# Patient Record
Sex: FEMALE | Race: WHITE | Employment: UNEMPLOYED | URBAN - METROPOLITAN AREA
[De-identification: names, ages, dates, MRNs, and addresses within clinical notes are randomized per-mention and may not be internally consistent; named-entity substitution may affect disease eponyms.]

---

## 2017-04-19 ENCOUNTER — HOSPITAL ENCOUNTER (OUTPATIENT)
Dept: ULTRASOUND IMAGING | Age: 22
Discharge: HOME | End: 2017-04-19
Payer: COMMERCIAL

## 2017-04-19 DIAGNOSIS — N91.2 AMENORRHEA: ICD-10-CM

## 2017-04-19 PROCEDURE — 76801 OB US < 14 WKS SINGLE FETUS: CPT

## 2017-04-25 ENCOUNTER — INITIAL PRENATAL (OUTPATIENT)
Dept: OBGYN | Age: 22
End: 2017-04-25

## 2017-04-25 VITALS — SYSTOLIC BLOOD PRESSURE: 108 MMHG | DIASTOLIC BLOOD PRESSURE: 72 MMHG | WEIGHT: 156 LBS

## 2017-04-25 DIAGNOSIS — Z34.01 ENCOUNTER FOR SUPERVISION OF NORMAL FIRST PREGNANCY IN FIRST TRIMESTER: Primary | ICD-10-CM

## 2017-04-25 DIAGNOSIS — Z34.01 ENCOUNTER FOR SUPERVISION OF NORMAL FIRST PREGNANCY IN FIRST TRIMESTER: ICD-10-CM

## 2017-04-25 LAB
ABO/RH: NORMAL
ANTIBODY SCREEN: NORMAL
APTT: 27.1 SEC (ref 21.6–35.4)
BASOPHILS ABSOLUTE: 0.1 K/UL (ref 0–0.2)
BASOPHILS RELATIVE PERCENT: 0.6 %
EOSINOPHILS ABSOLUTE: 0.1 K/UL (ref 0–0.7)
EOSINOPHILS RELATIVE PERCENT: 1.5 %
GLUCOSE BLD-MCNC: 95 MG/DL (ref 74–109)
HCT VFR BLD CALC: 43.9 % (ref 37–47)
HEMOGLOBIN: 15.2 G/DL (ref 12–16)
HEPATITIS B SURFACE ANTIGEN INTERPRETATION: NORMAL
LYMPHOCYTES ABSOLUTE: 1.4 K/UL (ref 1–4.8)
LYMPHOCYTES RELATIVE PERCENT: 13.5 %
MCH RBC QN AUTO: 29 PG (ref 27–31.3)
MCHC RBC AUTO-ENTMCNC: 34.6 % (ref 33–37)
MCV RBC AUTO: 83.8 FL (ref 82–100)
MONOCYTES ABSOLUTE: 0.6 K/UL (ref 0.2–0.8)
MONOCYTES RELATIVE PERCENT: 6 %
NEUTROPHILS ABSOLUTE: 8 K/UL (ref 1.4–6.5)
NEUTROPHILS RELATIVE PERCENT: 78.4 %
PDW BLD-RTO: 13.1 % (ref 11.5–14.5)
PLATELET # BLD: 164 K/UL (ref 130–400)
RBC # BLD: 5.24 M/UL (ref 4.2–5.4)
RUBELLA ANTIBODY IGG: 78.9 IU/ML
WBC # BLD: 10.2 K/UL (ref 4.5–11)

## 2017-04-25 PROCEDURE — 99203 OFFICE O/P NEW LOW 30 MIN: CPT | Performed by: OBSTETRICS & GYNECOLOGY

## 2017-04-25 RX ORDER — ASCORBIC ACID, CHOLECALCIFEROL, .ALPHA.-TOCOPHEROL ACETATE, DL-, PYRIDOXINE HYDROCHLORIDE, FOLIC ACID, CYANOCOBALAMIN, BIOTIN, CALCIUM CARBONATE, FERROUS ASPARTO GLYCINATE, IRON, POTASSIUM IODIDE, MAGNESIUM OXIDE, DOCONEXENT AND LOWBUSH BLUEBERRY 60; 1000; 10; 26; 400; 13; 280; 80; 9; 9; 150; 25; 350; 25; 600 MG/1; [IU]/1; [IU]/1; MG/1; UG/1; UG/1; UG/1; MG/1; MG/1; MG/1; UG/1; MG/1; MG/1; MG/1; UG/1
CAPSULE, GELATIN COATED ORAL
Qty: 3 CAPSULE | Refills: 0
Start: 2017-04-25 | End: 2018-08-16

## 2017-04-25 RX ORDER — .PYRIDOXINE HYDROCHLORIDE, CYANOCOBALAMIN, CALCIUM CARBONATE AND FOLIC ACID 75; 12; 200; 1 MG/1; UG/1; MG/1; MG/1
TABLET, COATED ORAL
Qty: 3 TABLET | Refills: 0
Start: 2017-04-25 | End: 2018-08-16

## 2017-04-25 RX ORDER — PRENATAL 6/IRON/FA/B12/CALC/D3 35 MG-5 MG
TABLET, SEQUENTIAL ORAL
Qty: 7 EACH | Refills: 0
Start: 2017-04-25 | End: 2017-04-28 | Stop reason: SDUPTHER

## 2017-04-25 RX ORDER — ASCORBIC ACID, BIOTIN, CHOLECALCIFEROL, CYANOCOBALAMIN, FOLIC ACID, FERROUS ASPARTO GLYCINATE, POTASSIUM IODIDE, PYRIDOXINE HYDROCHLORIDE, .ALPHA.-TOCOPHEROL ACETATE, DL-, DOCUSATE SODIUM AND BLUEBERRY 30; 75; 500; 13; 400; 10; 150; 5; 10; 200; 5; 600 MG/1; UG/1; [IU]/1; UG/1; UG/1; MG/1; UG/1; MG/1; [IU]/1; MG/1; MG/1; UG/1
1 TABLET, FILM COATED ORAL DAILY
Qty: 3 CAPSULE | Refills: 0
Start: 2017-04-25 | End: 2018-08-16

## 2017-04-26 LAB
RPR: NORMAL
VZV IGG SER QL IA: 108 IV

## 2017-04-27 LAB
CHLAMYDIA TRACHOMATIS AMPLIFIED DET: NEGATIVE
HIV-1 AND HIV-2 ANTIBODIES: NEGATIVE
N GONORRHOEAE AMPLIFIED DET: NEGATIVE
SPECIMEN SOURCE: NORMAL

## 2017-04-28 RX ORDER — PRENATAL 6/IRON/FA/B12/CALC/D3 35 MG-5 MG
1 TABLET, SEQUENTIAL ORAL DAILY
Qty: 60 EACH | Refills: 6 | Status: SHIPPED | OUTPATIENT
Start: 2017-04-28 | End: 2018-05-14 | Stop reason: SDUPTHER

## 2017-04-30 RX ORDER — DOXYLAMINE SUCCINATE AND PYRIDOXINE HYDROCHLORIDE, DELAYED RELEASE TABLETS 10 MG/10 MG 10; 10 MG/1; MG/1
TABLET, DELAYED RELEASE ORAL
Qty: 24 TABLET | Refills: 0
Start: 2017-04-30 | End: 2018-08-16

## 2017-05-03 LAB
EER NON INVASIVE PRENATAL ANEUPLOIDY: NORMAL
FETAL FRACTION: 7.9
FETAL GENDER: NORMAL
GESTATIONAL AGE (DAYS): 4
GESTATIONAL AGE(WEEKS): 11
Lab: NORMAL
MATERNAL WEIGHT: 197
MONOSOMY X: NORMAL
REPORT FETUS GENDER: YES
TRIPLOIDY (VANISHING TWIN): NORMAL
TRISOMY 13 RISK: NORMAL
TRISOMY 18 RISK ASSESSMENT: NORMAL
TRISOMY 21 RISK: NORMAL

## 2017-05-11 LAB
Lab: NORMAL
REPORT: NORMAL
THIS TEST SENT TO: NORMAL

## 2017-05-24 ENCOUNTER — TELEPHONE (OUTPATIENT)
Dept: OBGYN | Age: 22
End: 2017-05-24

## 2018-08-08 ENCOUNTER — OFFICE VISIT (OUTPATIENT)
Dept: FAMILY MEDICINE CLINIC | Age: 23
End: 2018-08-08
Payer: COMMERCIAL

## 2018-08-08 VITALS
DIASTOLIC BLOOD PRESSURE: 80 MMHG | HEART RATE: 72 BPM | TEMPERATURE: 98.5 F | SYSTOLIC BLOOD PRESSURE: 118 MMHG | HEIGHT: 65 IN | WEIGHT: 205 LBS | OXYGEN SATURATION: 98 % | BODY MASS INDEX: 34.16 KG/M2 | RESPIRATION RATE: 16 BRPM

## 2018-08-08 DIAGNOSIS — R39.9 UTI SYMPTOMS: ICD-10-CM

## 2018-08-08 DIAGNOSIS — R31.9 HEMATURIA, UNSPECIFIED TYPE: ICD-10-CM

## 2018-08-08 DIAGNOSIS — R35.0 URINARY FREQUENCY: ICD-10-CM

## 2018-08-08 DIAGNOSIS — R39.9 UTI SYMPTOMS: Primary | ICD-10-CM

## 2018-08-08 LAB
APPEARANCE FLUID: NORMAL
BILIRUBIN, POC: NORMAL
BLOOD URINE, POC: 200
CLARITY, POC: NORMAL
COLOR, POC: YELLOW
GLUCOSE URINE, POC: NORMAL
KETONES, POC: 0.5
LEUKOCYTE EST, POC: 70
NITRITE, POC: NORMAL
PH, POC: 7
PROTEIN, POC: 3
SPECIFIC GRAVITY, POC: 1.02
UROBILINOGEN, POC: 17

## 2018-08-08 PROCEDURE — 1036F TOBACCO NON-USER: CPT | Performed by: NURSE PRACTITIONER

## 2018-08-08 PROCEDURE — G8417 CALC BMI ABV UP PARAM F/U: HCPCS | Performed by: NURSE PRACTITIONER

## 2018-08-08 PROCEDURE — G8427 DOCREV CUR MEDS BY ELIG CLIN: HCPCS | Performed by: NURSE PRACTITIONER

## 2018-08-08 PROCEDURE — 99203 OFFICE O/P NEW LOW 30 MIN: CPT | Performed by: NURSE PRACTITIONER

## 2018-08-08 PROCEDURE — 81002 URINALYSIS NONAUTO W/O SCOPE: CPT | Performed by: NURSE PRACTITIONER

## 2018-08-08 RX ORDER — IBUPROFEN 600 MG/1
TABLET ORAL
COMMUNITY
Start: 2018-05-13

## 2018-08-08 RX ORDER — BLOOD-GLUCOSE METER
KIT MISCELLANEOUS
COMMUNITY
Start: 2018-05-07

## 2018-08-08 RX ORDER — NITROFURANTOIN 25; 75 MG/1; MG/1
100 CAPSULE ORAL 2 TIMES DAILY
Qty: 14 CAPSULE | Refills: 0 | Status: SHIPPED | OUTPATIENT
Start: 2018-08-08 | End: 2018-08-15

## 2018-08-08 RX ORDER — PHENAZOPYRIDINE HYDROCHLORIDE 200 MG/1
200 TABLET, FILM COATED ORAL 3 TIMES DAILY PRN
Qty: 15 TABLET | Refills: 0 | Status: SHIPPED | OUTPATIENT
Start: 2018-08-08 | End: 2018-08-13

## 2018-08-08 ASSESSMENT — ENCOUNTER SYMPTOMS
TROUBLE SWALLOWING: 0
EYE DISCHARGE: 0
EYE REDNESS: 0
NAUSEA: 1
EYE PAIN: 0
DIARRHEA: 0
SHORTNESS OF BREATH: 0
ABDOMINAL PAIN: 0
EYE ITCHING: 0
SINUS PRESSURE: 0
VOMITING: 0
PHOTOPHOBIA: 0
SORE THROAT: 0
COUGH: 0
RHINORRHEA: 0

## 2018-08-11 LAB
ORGANISM: ABNORMAL
URINE CULTURE, ROUTINE: ABNORMAL
URINE CULTURE, ROUTINE: ABNORMAL

## 2018-08-16 ENCOUNTER — OFFICE VISIT (OUTPATIENT)
Dept: FAMILY MEDICINE CLINIC | Age: 23
End: 2018-08-16
Payer: COMMERCIAL

## 2018-08-16 VITALS
BODY MASS INDEX: 33.66 KG/M2 | RESPIRATION RATE: 16 BRPM | DIASTOLIC BLOOD PRESSURE: 70 MMHG | HEART RATE: 78 BPM | SYSTOLIC BLOOD PRESSURE: 110 MMHG | OXYGEN SATURATION: 97 % | WEIGHT: 202 LBS | HEIGHT: 65 IN | TEMPERATURE: 98.9 F

## 2018-08-16 DIAGNOSIS — J45.909 UNCOMPLICATED ASTHMA, UNSPECIFIED ASTHMA SEVERITY, UNSPECIFIED WHETHER PERSISTENT: ICD-10-CM

## 2018-08-16 DIAGNOSIS — Z12.4 SCREENING FOR CERVICAL CANCER: ICD-10-CM

## 2018-08-16 DIAGNOSIS — Z13.0 SCREENING FOR BLOOD DISEASE: ICD-10-CM

## 2018-08-16 DIAGNOSIS — F32.A DEPRESSION, UNSPECIFIED DEPRESSION TYPE: ICD-10-CM

## 2018-08-16 DIAGNOSIS — A49.2 HAEMOPHILUS INFLUENZAE INFECTION: Primary | ICD-10-CM

## 2018-08-16 DIAGNOSIS — J02.9 ACUTE VIRAL PHARYNGITIS: Primary | ICD-10-CM

## 2018-08-16 DIAGNOSIS — J02.9 ACUTE VIRAL PHARYNGITIS: ICD-10-CM

## 2018-08-16 LAB
ALBUMIN SERPL-MCNC: 4.4 G/DL (ref 3.9–4.9)
ALP BLD-CCNC: 70 U/L (ref 40–130)
ALT SERPL-CCNC: 27 U/L (ref 0–33)
ANION GAP SERPL CALCULATED.3IONS-SCNC: 14 MEQ/L (ref 7–13)
AST SERPL-CCNC: 19 U/L (ref 0–35)
BASOPHILS ABSOLUTE: 0.1 K/UL (ref 0–0.2)
BASOPHILS RELATIVE PERCENT: 0.9 %
BILIRUB SERPL-MCNC: 0.5 MG/DL (ref 0–1.2)
BUN BLDV-MCNC: 10 MG/DL (ref 6–20)
CALCIUM SERPL-MCNC: 9.3 MG/DL (ref 8.6–10.2)
CHLORIDE BLD-SCNC: 101 MEQ/L (ref 98–107)
CO2: 26 MEQ/L (ref 22–29)
CONTROL: NORMAL
CREAT SERPL-MCNC: 0.63 MG/DL (ref 0.5–0.9)
EOSINOPHILS ABSOLUTE: 0.5 K/UL (ref 0–0.7)
EOSINOPHILS RELATIVE PERCENT: 5.3 %
GFR AFRICAN AMERICAN: >60
GFR NON-AFRICAN AMERICAN: >60
GLOBULIN: 3.1 G/DL (ref 2.3–3.5)
GLUCOSE BLD-MCNC: 87 MG/DL (ref 74–109)
HCT VFR BLD CALC: 48.3 % (ref 37–47)
HEMOGLOBIN: 16.4 G/DL (ref 12–16)
LYMPHOCYTES ABSOLUTE: 2.3 K/UL (ref 1–4.8)
LYMPHOCYTES RELATIVE PERCENT: 23.1 %
MCH RBC QN AUTO: 28.4 PG (ref 27–31.3)
MCHC RBC AUTO-ENTMCNC: 33.9 % (ref 33–37)
MCV RBC AUTO: 83.8 FL (ref 82–100)
MONOCYTES ABSOLUTE: 0.8 K/UL (ref 0.2–0.8)
MONOCYTES RELATIVE PERCENT: 8.2 %
NEUTROPHILS ABSOLUTE: 6.2 K/UL (ref 1.4–6.5)
NEUTROPHILS RELATIVE PERCENT: 62.5 %
PDW BLD-RTO: 13.4 % (ref 11.5–14.5)
PLATELET # BLD: 198 K/UL (ref 130–400)
POTASSIUM SERPL-SCNC: 4.4 MEQ/L (ref 3.5–5.1)
PREGNANCY TEST URINE, POC: NORMAL
RBC # BLD: 5.77 M/UL (ref 4.2–5.4)
S PYO AG THROAT QL: NORMAL
SODIUM BLD-SCNC: 141 MEQ/L (ref 132–144)
TOTAL PROTEIN: 7.5 G/DL (ref 6.4–8.1)
TSH REFLEX: 2.46 UIU/ML (ref 0.27–4.2)
WBC # BLD: 9.9 K/UL (ref 4.8–10.8)

## 2018-08-16 PROCEDURE — G8427 DOCREV CUR MEDS BY ELIG CLIN: HCPCS | Performed by: INTERNAL MEDICINE

## 2018-08-16 PROCEDURE — 99204 OFFICE O/P NEW MOD 45 MIN: CPT | Performed by: INTERNAL MEDICINE

## 2018-08-16 PROCEDURE — 87880 STREP A ASSAY W/OPTIC: CPT | Performed by: INTERNAL MEDICINE

## 2018-08-16 PROCEDURE — 81025 URINE PREGNANCY TEST: CPT | Performed by: INTERNAL MEDICINE

## 2018-08-16 PROCEDURE — G8417 CALC BMI ABV UP PARAM F/U: HCPCS | Performed by: INTERNAL MEDICINE

## 2018-08-16 PROCEDURE — 1036F TOBACCO NON-USER: CPT | Performed by: INTERNAL MEDICINE

## 2018-08-16 RX ORDER — METHYLPREDNISOLONE 4 MG/1
TABLET ORAL
Qty: 1 KIT | Refills: 0 | Status: SHIPPED | OUTPATIENT
Start: 2018-08-16 | End: 2018-08-22

## 2018-08-16 RX ORDER — ALBUTEROL SULFATE 90 UG/1
1 AEROSOL, METERED RESPIRATORY (INHALATION) EVERY 6 HOURS PRN
Qty: 1 INHALER | Refills: 2 | Status: SHIPPED | OUTPATIENT
Start: 2018-08-16 | End: 2018-11-07 | Stop reason: SDUPTHER

## 2018-08-16 ASSESSMENT — ENCOUNTER SYMPTOMS
BACK PAIN: 0
SHORTNESS OF BREATH: 0
SORE THROAT: 1
EYE PAIN: 0
ABDOMINAL PAIN: 0
COUGH: 1

## 2018-08-16 ASSESSMENT — PATIENT HEALTH QUESTIONNAIRE - PHQ9
2. FEELING DOWN, DEPRESSED OR HOPELESS: 0
SUM OF ALL RESPONSES TO PHQ QUESTIONS 1-9: 0
SUM OF ALL RESPONSES TO PHQ QUESTIONS 1-9: 0
SUM OF ALL RESPONSES TO PHQ9 QUESTIONS 1 & 2: 0
1. LITTLE INTEREST OR PLEASURE IN DOING THINGS: 0

## 2018-08-16 NOTE — PROGRESS NOTES
Subjective:      Patient ID: Betty Barrett is a 26 y.o. female who presents today with:  Chief Complaint   Patient presents with   [facility] Establish Care    Urinary Tract Infection     follow up - was seen at walk in clinic on 8/8/2018 for uti    Cough     started 8/14/2018       HPI     Pharyngitis-Acute, started about 2 days ago. No trauma to throat. Able to swallow. Feels painful. No drooling or voice changes. No hiv or std risk factors. Asthma-Last time used rescue inhaler was 1.5 years ago. Not on scheduled inhaler     Depression-Chronic, improved with zoloft 50 mg once daily. No SI.HI. Tolerating it well. Past Medical History:   Diagnosis Date    Anxiety     Asthma     Depression     Seasonal allergies      Past Surgical History:   Procedure Laterality Date    NASAL POLYP SURGERY      x3    TONSILLECTOMY AND ADENOIDECTOMY       Social History     Social History    Marital status: Single     Spouse name: N/A    Number of children: N/A    Years of education: N/A     Occupational History    Not on file. Social History Main Topics    Smoking status: Former Smoker     Types: Cigarettes    Smokeless tobacco: Never Used    Alcohol use Yes      Comment: social     Drug use: No    Sexual activity: Yes     Partners: Male     Other Topics Concern    Not on file     Social History Narrative    No narrative on file     Allergies   Allergen Reactions    Other Hives     mushrooms     Current Outpatient Prescriptions on File Prior to Visit   Medication Sig Dispense Refill    ibuprofen (ADVIL;MOTRIN) 600 MG tablet       Prenatal Multivit-Min-Fe-FA (PRENATAL VITAMINS PO) Take by mouth      FREESTYLE LITE strip        No current facility-administered medications on file prior to visit. I have personally reviewed the ROS, PMH, PFH, and social history     Review of Systems   Constitutional: Negative for chills and fever. HENT: Positive for congestion and sore throat.     Eyes: Negative for

## 2018-08-16 NOTE — PATIENT INSTRUCTIONS
chances of quitting for good. · Use a vaporizer or humidifier to add moisture to your bedroom. Follow the directions for cleaning the machine. When should you call for help? Call your doctor now or seek immediate medical care if:    · You have new or worse trouble swallowing.     · Your sore throat gets much worse on one side.    Watch closely for changes in your health, and be sure to contact your doctor if you do not get better as expected. Where can you learn more? Go to [email or web address].[email or web address] org and sign in to your [facility] account. Enter E325 in the Joberator box to learn more about \"Sore Throat: Care Instructions. \"     If you do not have an account, please click on the \"Sign Up Now\" link. Current as of: May 12, 2017  Content Version: 11.7  © 3829-3824 [employer]. Care instructions adapted under license by [facility] ([facility]). If you have questions about a medical condition or this instruction, always ask your healthcare professional. [place] [id number] any warranty or liability for your use of this information.

## 2018-08-18 RX ORDER — AMPICILLIN 500 MG/1
500 CAPSULE ORAL 4 TIMES DAILY
Qty: 28 CAPSULE | Refills: 0 | Status: SHIPPED | OUTPATIENT
Start: 2018-08-18 | End: 2018-08-25

## 2018-08-19 LAB
ORGANISM: ABNORMAL
THROAT CULTURE: ABNORMAL
THROAT CULTURE: ABNORMAL

## 2018-09-26 DIAGNOSIS — J02.9 ACUTE VIRAL PHARYNGITIS: ICD-10-CM

## 2018-09-27 RX ORDER — METHYLPREDNISOLONE 4 MG/1
TABLET ORAL
Qty: 21 TABLET | Refills: 0 | OUTPATIENT
Start: 2018-09-27

## 2018-11-07 DIAGNOSIS — J45.909 UNCOMPLICATED ASTHMA, UNSPECIFIED ASTHMA SEVERITY, UNSPECIFIED WHETHER PERSISTENT: ICD-10-CM

## 2024-01-27 ENCOUNTER — HOSPITAL ENCOUNTER (EMERGENCY)
Facility: HOSPITAL | Age: 29
Discharge: HOME | End: 2024-01-27

## 2024-01-27 ENCOUNTER — APPOINTMENT (OUTPATIENT)
Dept: RADIOLOGY | Facility: HOSPITAL | Age: 29
End: 2024-01-27

## 2024-01-27 VITALS
HEART RATE: 55 BPM | OXYGEN SATURATION: 99 % | DIASTOLIC BLOOD PRESSURE: 77 MMHG | SYSTOLIC BLOOD PRESSURE: 125 MMHG | TEMPERATURE: 97.5 F | BODY MASS INDEX: 30.12 KG/M2 | HEIGHT: 65 IN | RESPIRATION RATE: 16 BRPM | WEIGHT: 180.78 LBS

## 2024-01-27 DIAGNOSIS — T83.9XXA COMPLICATION OF INTRAUTERINE DEVICE (IUD), UNSPECIFIED COMPLICATION, INITIAL ENCOUNTER (CMS-HCC): Primary | ICD-10-CM

## 2024-01-27 LAB
APPEARANCE UR: CLEAR
BILIRUB UR STRIP.AUTO-MCNC: NEGATIVE MG/DL
COLOR UR: YELLOW
GLUCOSE UR STRIP.AUTO-MCNC: NEGATIVE MG/DL
HCG UR QL IA.RAPID: NEGATIVE
HOLD SPECIMEN: NORMAL
KETONES UR STRIP.AUTO-MCNC: NEGATIVE MG/DL
LEUKOCYTE ESTERASE UR QL STRIP.AUTO: NEGATIVE
NITRITE UR QL STRIP.AUTO: NEGATIVE
PH UR STRIP.AUTO: 6 [PH]
PROT UR STRIP.AUTO-MCNC: NEGATIVE MG/DL
RBC # UR STRIP.AUTO: ABNORMAL /UL
RBC #/AREA URNS AUTO: ABNORMAL /HPF
SP GR UR STRIP.AUTO: 1.02
SQUAMOUS #/AREA URNS AUTO: ABNORMAL /HPF
UROBILINOGEN UR STRIP.AUTO-MCNC: 2 MG/DL
WBC #/AREA URNS AUTO: ABNORMAL /HPF

## 2024-01-27 PROCEDURE — 76856 US EXAM PELVIC COMPLETE: CPT | Performed by: RADIOLOGY

## 2024-01-27 PROCEDURE — 76856 US EXAM PELVIC COMPLETE: CPT

## 2024-01-27 PROCEDURE — 81025 URINE PREGNANCY TEST: CPT | Performed by: NURSE PRACTITIONER

## 2024-01-27 PROCEDURE — 76830 TRANSVAGINAL US NON-OB: CPT | Performed by: RADIOLOGY

## 2024-01-27 PROCEDURE — 81001 URINALYSIS AUTO W/SCOPE: CPT | Performed by: NURSE PRACTITIONER

## 2024-01-27 PROCEDURE — 99284 EMERGENCY DEPT VISIT MOD MDM: CPT | Mod: 25,27

## 2024-01-27 ASSESSMENT — COLUMBIA-SUICIDE SEVERITY RATING SCALE - C-SSRS
1. IN THE PAST MONTH, HAVE YOU WISHED YOU WERE DEAD OR WISHED YOU COULD GO TO SLEEP AND NOT WAKE UP?: NO
6. HAVE YOU EVER DONE ANYTHING, STARTED TO DO ANYTHING, OR PREPARED TO DO ANYTHING TO END YOUR LIFE?: NO
2. HAVE YOU ACTUALLY HAD ANY THOUGHTS OF KILLING YOURSELF?: NO

## 2024-01-27 ASSESSMENT — PAIN DESCRIPTION - PAIN TYPE: TYPE: ACUTE PAIN

## 2024-01-27 ASSESSMENT — PAIN - FUNCTIONAL ASSESSMENT: PAIN_FUNCTIONAL_ASSESSMENT: 0-10

## 2024-01-27 ASSESSMENT — LIFESTYLE VARIABLES
EVER FELT BAD OR GUILTY ABOUT YOUR DRINKING: NO
EVER HAD A DRINK FIRST THING IN THE MORNING TO STEADY YOUR NERVES TO GET RID OF A HANGOVER: NO
HAVE YOU EVER FELT YOU SHOULD CUT DOWN ON YOUR DRINKING: NO
HAVE PEOPLE ANNOYED YOU BY CRITICIZING YOUR DRINKING: NO
REASON UNABLE TO ASSESS: NO

## 2024-01-27 ASSESSMENT — PAIN DESCRIPTION - ONSET: ONSET: ONGOING

## 2024-01-27 ASSESSMENT — PAIN DESCRIPTION - DESCRIPTORS: DESCRIPTORS: CRAMPING

## 2024-01-27 ASSESSMENT — PAIN DESCRIPTION - ORIENTATION: ORIENTATION: MID;LOWER

## 2024-01-27 ASSESSMENT — PAIN DESCRIPTION - FREQUENCY: FREQUENCY: CONSTANT/CONTINUOUS

## 2024-01-27 ASSESSMENT — PAIN DESCRIPTION - LOCATION: LOCATION: ABDOMEN

## 2024-01-27 ASSESSMENT — PAIN SCALES - GENERAL: PAINLEVEL_OUTOF10: 3

## 2024-01-27 NOTE — DISCHARGE INSTRUCTIONS
Recommend following up with GYN, [place] Mario and Matthew are available with 1:00 appointments saved daily for ER follow-up.

## 2024-01-27 NOTE — ED PROVIDER NOTES
"HPI   Chief Complaint   Patient presents with    Abdominal Pain     \"I feel like my IUD is coming out, I've had abnormal bleeding & lots of pain with sex\"       29-year-old female presents emergency department, patient is concerned she has a complication with her IUD.  Patient states that she feels as though she can feel her IUD when she inserts her finger in her vagina.  Also notes pain with intercourse that is unusual for her.  Patient states she has had abnormal vaginal bleeding as well, states she has been bleeding or spotting continuously for the last 2 months.  States IUD  was placed about 6 years ago, during her last checkup she was reassured that it should be good for about 7 years    Denies any urinary symptoms, nausea/vomiting or fever/chills.      History provided by:  Patient   used: No                        Richie Coma Scale Score: 15                  Patient History   Past Medical History:   Diagnosis Date    Encounter for gynecological examination (general) (routine) without abnormal findings     Pap test, as part of routine gynecological examination    Unspecified dislocation of unspecified patella, initial encounter 2021    Dislocation of patella     Past Surgical History:   Procedure Laterality Date    OTHER SURGICAL HISTORY  12/15/2020    Nasal polypectomy    OTHER SURGICAL HISTORY  12/15/2020    [place] tooth extraction    OTHER SURGICAL HISTORY  12/15/2020     section    OTHER SURGICAL HISTORY  12/15/2020    Tonsillectomy with adenoidectomy     No family history on file.  Social History     Tobacco Use    Smoking status: Not on file    Smokeless tobacco: Not on file   Substance Use Topics    Alcohol use: Not on file    Drug use: Not on file       Physical Exam   ED Triage Vitals   Temperature Heart Rate Respirations BP   24 0159 24 0159 24 0159 24 0205   36.4 °C (97.5 °F) 60 18 (!) 144/93      Pulse Ox Temp Source Heart Rate Source " Patient Position   01/27/24 0159 01/27/24 0159 01/27/24 0159 01/27/24 0159   100 % Temporal Monitor Sitting      BP Location FiO2 (%)     01/27/24 0159 --     Right arm        Physical Exam  General: Vitals noted, no distress. Afebrile.  Cardiac: Regular rate and rhythm, no murmur, no gallop  Pulmonary: Lungs clear bilaterally with good aeration. No adventitious breath sounds.  Abdomen: Soft, nontender, nonsurgical. No peritoneal signs. Normoactive bowel sounds  Pelvic Exam: Speculum exam shows no bleeding, discharge, ulcerations, lesions. Bimanual exam shows no adnexal pain or mass. No cervical motion tenderness.  IUD strings are noted exiting the cervix, strings do appear quite long.  Extremities: No peripheral edema. Negative Homans bilaterally, no cords.   Skin:  No rash.  Neuro: No focal neurologic deficits, NIH score of 0.  ED Course & MDM   ED Course as of 01/27/24 0404   Sat Jan 27, 2024   0324 US PELVIS TRANSABDOMINAL WITH TRANSVAGINAL [OT]      ED Course User Index  [OT] Lashonda Vo MD         Diagnoses as of 01/27/24 0404   Complication of intrauterine device (IUD), unspecified complication, initial encounter (CMS/[facility])     Labs Reviewed   URINALYSIS WITH REFLEX CULTURE AND MICROSCOPIC - Abnormal       Result Value    Color, Urine Yellow      Appearance, Urine Clear      Specific Gravity, Urine 1.021      pH, Urine 6.0      Protein, Urine NEGATIVE      Glucose, Urine NEGATIVE      Blood, Urine LARGE (3+) (*)     Ketones, Urine NEGATIVE      Bilirubin, Urine NEGATIVE      Urobilinogen, Urine 2.0 (*)     Nitrite, Urine NEGATIVE      Leukocyte Esterase, Urine NEGATIVE     URINALYSIS MICROSCOPIC WITH REFLEX CULTURE - Abnormal    WBC, Urine 1-5      RBC, Urine 11-20 (*)     Squamous Epithelial Cells, Urine 1-9 (SPARSE)     HCG, URINE, QUALITATIVE - Normal    HCG, Urine NEGATIVE     URINALYSIS WITH REFLEX CULTURE AND MICROSCOPIC    Narrative:     The following orders were created for panel order Urinalysis with  Reflex Culture and Microscopic.  Procedure                               Abnormality         Status                     ---------                               -----------         ------                     Urinalysis with Reflex Cu...[[id number]]  Abnormal            Final result               Extra Urine Gray Tube[[id number]]                            In process                   Please view results for these tests on the individual orders.   EXTRA URINE GRAY TUBE        US PELVIS TRANSABDOMINAL WITH TRANSVAGINAL   Final Result   Inferior position of the intrauterine device located in the lower   uterine segment and extending to the level of the cervix; gynecology   consultation/evaluation is recommended.        MACRO:   None        Signed by: Kamron Rizvi 1/27/2024 3:46 AM   Dictation workstation:   [id number]               Medical Decision Making  Gentle pressure was applied to the IUD strings as the patient consented to have the IUD removed if it was easily able to, with gentle pressure the IUD did not want to exit the cervix.    Ultrasound imaging was then ordered, did show inferior position of the IUD in the lower uterine segment.    Discussed results with the patient, discussed close follow-up with GYN.  Continue to closely monitor her symptoms, return with any worsening symptoms or any additional concerns.    Procedure  Procedures     Antonina Ko, JACQUELINE-SAMRA  01/27/24 0406

## 2024-11-15 ENCOUNTER — HOSPITAL ENCOUNTER (EMERGENCY)
Facility: HOSPITAL | Age: 29
Discharge: HOME | End: 2024-11-15

## 2024-11-15 VITALS
BODY MASS INDEX: 30.73 KG/M2 | RESPIRATION RATE: 17 BRPM | WEIGHT: 180 LBS | SYSTOLIC BLOOD PRESSURE: 169 MMHG | TEMPERATURE: 98.1 F | DIASTOLIC BLOOD PRESSURE: 97 MMHG | HEIGHT: 64 IN | OXYGEN SATURATION: 98 % | HEART RATE: 57 BPM

## 2024-11-15 DIAGNOSIS — Z32.02 NEGATIVE PREGNANCY TEST: Primary | ICD-10-CM

## 2024-11-15 LAB
APPEARANCE UR: CLEAR
BACTERIA #/AREA URNS AUTO: ABNORMAL /HPF
BILIRUB UR STRIP.AUTO-MCNC: NEGATIVE MG/DL
COLOR UR: ABNORMAL
GLUCOSE UR STRIP.AUTO-MCNC: NORMAL MG/DL
HCG UR QL IA.RAPID: NEGATIVE
KETONES UR STRIP.AUTO-MCNC: NEGATIVE MG/DL
LEUKOCYTE ESTERASE UR QL STRIP.AUTO: NEGATIVE
NITRITE UR QL STRIP.AUTO: NEGATIVE
PH UR STRIP.AUTO: 6.5 [PH]
PROT UR STRIP.AUTO-MCNC: NEGATIVE MG/DL
RBC # UR STRIP.AUTO: ABNORMAL /UL
RBC #/AREA URNS AUTO: ABNORMAL /HPF
SP GR UR STRIP.AUTO: 1.02
SQUAMOUS #/AREA URNS AUTO: ABNORMAL /HPF
UROBILINOGEN UR STRIP.AUTO-MCNC: ABNORMAL MG/DL
WBC #/AREA URNS AUTO: ABNORMAL /HPF

## 2024-11-15 PROCEDURE — 81001 URINALYSIS AUTO W/SCOPE: CPT | Performed by: PHYSICIAN ASSISTANT

## 2024-11-15 PROCEDURE — 99283 EMERGENCY DEPT VISIT LOW MDM: CPT

## 2024-11-15 PROCEDURE — 81025 URINE PREGNANCY TEST: CPT | Performed by: PHYSICIAN ASSISTANT

## 2024-11-15 ASSESSMENT — COLUMBIA-SUICIDE SEVERITY RATING SCALE - C-SSRS
2. HAVE YOU ACTUALLY HAD ANY THOUGHTS OF KILLING YOURSELF?: NO
6. HAVE YOU EVER DONE ANYTHING, STARTED TO DO ANYTHING, OR PREPARED TO DO ANYTHING TO END YOUR LIFE?: NO
1. IN THE PAST MONTH, HAVE YOU WISHED YOU WERE DEAD OR WISHED YOU COULD GO TO SLEEP AND NOT WAKE UP?: NO

## 2024-11-15 ASSESSMENT — LIFESTYLE VARIABLES
TOTAL SCORE: 0
EVER FELT BAD OR GUILTY ABOUT YOUR DRINKING: NO
EVER HAD A DRINK FIRST THING IN THE MORNING TO STEADY YOUR NERVES TO GET RID OF A HANGOVER: NO
HAVE YOU EVER FELT YOU SHOULD CUT DOWN ON YOUR DRINKING: NO
HAVE PEOPLE ANNOYED YOU BY CRITICIZING YOUR DRINKING: NO

## 2024-11-15 ASSESSMENT — PAIN - FUNCTIONAL ASSESSMENT: PAIN_FUNCTIONAL_ASSESSMENT: 0-10

## 2024-11-15 ASSESSMENT — PAIN DESCRIPTION - DESCRIPTORS: DESCRIPTORS: CRAMPING

## 2024-11-16 LAB — HOLD SPECIMEN: NORMAL

## 2024-11-16 NOTE — ED PROVIDER NOTES
HPI   Chief Complaint   Patient presents with    Possible Pregnancy     Patient states LMP 10/5. Patient states positive pregnancy tests at home, spotting started today.       29-year-old female, otherwise healthy presenting to the ER today for pregnancy test.  Patient tells me her last period was 10/5.  She was late for her period and states that normally she is on time.  She took a pregnancy test a few days ago that was faintly positive.  She took another test today that was also family positive but then she also started to have light vaginal spotting today.  She denies nausea or vomiting, diarrhea or constipation and denies painful urination, difficulty urinating, urinary frequency or blood in the urine.  She states that she is having just very light vaginal spotting and slight cramping.  No further complaints at this time.      History provided by:  Patient          Patient History   Past Medical History:   Diagnosis Date    Encounter for gynecological examination (general) (routine) without abnormal findings     Pap test, as part of routine gynecological examination    Unspecified dislocation of unspecified patella, initial encounter 2021    Dislocation of patella     Past Surgical History:   Procedure Laterality Date    OTHER SURGICAL HISTORY  12/15/2020    Nasal polypectomy    OTHER SURGICAL HISTORY  12/15/2020    [place] tooth extraction    OTHER SURGICAL HISTORY  12/15/2020     section    OTHER SURGICAL HISTORY  12/15/2020    Tonsillectomy with adenoidectomy     No family history on file.  Social History     Tobacco Use    Smoking status: Not on file    Smokeless tobacco: Not on file   Substance Use Topics    Alcohol use: Not on file    Drug use: Not on file       Physical Exam   ED Triage Vitals [11/15/24 1815]   Temperature Heart Rate Respirations BP   36.7 °C (98.1 °F) 67 16 (!) 171/102      Pulse Ox Temp Source Heart Rate Source Patient Position   97 % Temporal Monitor --      BP Location  FiO2 (%)     -- --       Physical Exam  Constitutional:       General: She is not in acute distress.  Eyes:      Conjunctiva/sclera: Conjunctivae normal.   Cardiovascular:      Rate and Rhythm: Normal rate and regular rhythm.      Pulses: Normal pulses.      Heart sounds: Normal heart sounds.   Pulmonary:      Effort: Pulmonary effort is normal.      Breath sounds: Normal breath sounds.   Abdominal:      General: Bowel sounds are normal. There is no distension.      Palpations: Abdomen is soft.      Tenderness: There is no abdominal tenderness. There is no right CVA tenderness, left CVA tenderness, guarding or rebound.   Musculoskeletal:      Comments: Normal gait and strength tone.   Skin:     General: Skin is warm.   Neurological:      Mental Status: She is alert and oriented to person, place, and time.           ED Course & MDM   Diagnoses as of 11/15/24 1921   Negative pregnancy test                 No data recorded     Pinson Coma Scale Score: 15 (11/15/24 1849 : Celeste Spring RN)                           Medical Decision Making  29-year-old female, otherwise healthy presenting to the ER today for pregnancy test.  LMP 10/5 and she had a faint positive pregnancy test a few days ago and then 1 again today but also started some vaginal spotting and light abdominal cramping.  She has no further associated symptoms and arrives afebrile, hypertensive with otherwise stable vital signs.  Patient is resting comfortably on exam without signs of acute distress.  She tells me that her spotting is very light and she is not soaking through tampons or pads.  On exam heart RRR, lungs are clear and there is no CVA tenderness.  Abdomen soft nondistended nontender with normal bowel sounds.  Urinalysis and pregnancy test are ordered.    Urinalysis today without evidence of infection.  Urine pregnancy test is negative.  I did discuss these results with the patient and discussed that she should follow-up closely with her  gynecologist next week.  Encouraged her to take another pregnancy test in the next 72 hours as well and monitor the symptoms.  I clearly outlined all warning signs to return to the ED and she expressed understanding and agreed with the plan of care today.      Labs Reviewed   URINALYSIS WITH REFLEX CULTURE AND MICROSCOPIC - Abnormal       Result Value    Color, Urine Light-Yellow      Appearance, Urine Clear      Specific Gravity, Urine 1.025      pH, Urine 6.5      Protein, Urine NEGATIVE      Glucose, Urine Normal      Blood, Urine 0.1 (1+) (*)     Ketones, Urine NEGATIVE      Bilirubin, Urine NEGATIVE      Urobilinogen, Urine 2 (1+) (*)     Nitrite, Urine NEGATIVE      Leukocyte Esterase, Urine NEGATIVE     URINALYSIS MICROSCOPIC WITH REFLEX CULTURE - Abnormal    WBC, Urine NONE      RBC, Urine 3-5      Squamous Epithelial Cells, Urine 1-9 (SPARSE)      Bacteria, Urine 1+ (*)    HCG, URINE, QUALITATIVE - Normal    HCG, Urine NEGATIVE     URINALYSIS WITH REFLEX CULTURE AND MICROSCOPIC    Narrative:     The following orders were created for panel order Urinalysis with Reflex Culture and Microscopic.  Procedure                               Abnormality         Status                     ---------                               -----------         ------                     Urinalysis with Reflex C...[[id number]]  Abnormal            Final result               Extra Urine Gray Tube[[id number]]                            In process                   Please view results for these tests on the individual orders.   EXTRA URINE GRAY TUBE       No orders to display         Procedure  Procedures     Keena Rivera PA-C  11/15/24 1921

## 2025-02-12 ENCOUNTER — APPOINTMENT (OUTPATIENT)
Dept: CARDIOLOGY | Facility: HOSPITAL | Age: 30
End: 2025-02-12
Payer: COMMERCIAL

## 2025-02-12 ENCOUNTER — HOSPITAL ENCOUNTER (EMERGENCY)
Facility: HOSPITAL | Age: 30
Discharge: HOME | End: 2025-02-12
Attending: STUDENT IN AN ORGANIZED HEALTH CARE EDUCATION/TRAINING PROGRAM
Payer: COMMERCIAL

## 2025-02-12 VITALS
DIASTOLIC BLOOD PRESSURE: 95 MMHG | HEART RATE: 69 BPM | TEMPERATURE: 97.5 F | SYSTOLIC BLOOD PRESSURE: 141 MMHG | RESPIRATION RATE: 18 BRPM | OXYGEN SATURATION: 98 % | BODY MASS INDEX: 30.82 KG/M2 | HEIGHT: 65 IN | WEIGHT: 185 LBS

## 2025-02-12 DIAGNOSIS — I10 HYPERTENSION, UNSPECIFIED TYPE: Primary | ICD-10-CM

## 2025-02-12 LAB
ANION GAP SERPL CALC-SCNC: 12 MMOL/L (ref 10–20)
ATRIAL RATE: 63 BPM
B-HCG SERPL-ACNC: <2 MIU/ML
BASOPHILS # BLD AUTO: 0.07 X10*3/UL (ref 0–0.1)
BASOPHILS NFR BLD AUTO: 0.9 %
BUN SERPL-MCNC: 12 MG/DL (ref 6–23)
CALCIUM SERPL-MCNC: 9.3 MG/DL (ref 8.6–10.3)
CHLORIDE SERPL-SCNC: 105 MMOL/L (ref 98–107)
CO2 SERPL-SCNC: 27 MMOL/L (ref 21–32)
CREAT SERPL-MCNC: 0.73 MG/DL (ref 0.5–1.05)
EGFRCR SERPLBLD CKD-EPI 2021: >90 ML/MIN/1.73M*2
EOSINOPHIL # BLD AUTO: 0.51 X10*3/UL (ref 0–0.7)
EOSINOPHIL NFR BLD AUTO: 6.3 %
ERYTHROCYTE [DISTWIDTH] IN BLOOD BY AUTOMATED COUNT: 12.5 % (ref 11.5–14.5)
GLUCOSE SERPL-MCNC: 103 MG/DL (ref 74–99)
HCT VFR BLD AUTO: 47.2 % (ref 36–46)
HGB BLD-MCNC: 16.7 G/DL (ref 12–16)
IMM GRANULOCYTES # BLD AUTO: 0.03 X10*3/UL (ref 0–0.7)
IMM GRANULOCYTES NFR BLD AUTO: 0.4 % (ref 0–0.9)
LYMPHOCYTES # BLD AUTO: 2.13 X10*3/UL (ref 1.2–4.8)
LYMPHOCYTES NFR BLD AUTO: 26.4 %
MCH RBC QN AUTO: 32.3 PG (ref 26–34)
MCHC RBC AUTO-ENTMCNC: 35.4 G/DL (ref 32–36)
MCV RBC AUTO: 91 FL (ref 80–100)
MONOCYTES # BLD AUTO: 0.69 X10*3/UL (ref 0.1–1)
MONOCYTES NFR BLD AUTO: 8.5 %
NEUTROPHILS # BLD AUTO: 4.65 X10*3/UL (ref 1.2–7.7)
NEUTROPHILS NFR BLD AUTO: 57.5 %
NRBC BLD-RTO: 0 /100 WBCS (ref 0–0)
P AXIS: 39 DEGREES
P OFFSET: 185 MS
P ONSET: 135 MS
PLATELET # BLD AUTO: 153 X10*3/UL (ref 150–450)
POTASSIUM SERPL-SCNC: 3.6 MMOL/L (ref 3.5–5.3)
PR INTERVAL: 162 MS
Q ONSET: 216 MS
QRS COUNT: 11 BEATS
QRS DURATION: 92 MS
QT INTERVAL: 430 MS
QTC CALCULATION(BAZETT): 440 MS
QTC FREDERICIA: 437 MS
R AXIS: 93 DEGREES
RBC # BLD AUTO: 5.17 X10*6/UL (ref 4–5.2)
SODIUM SERPL-SCNC: 140 MMOL/L (ref 136–145)
T AXIS: 66 DEGREES
T OFFSET: 431 MS
VENTRICULAR RATE: 63 BPM
WBC # BLD AUTO: 8.1 X10*3/UL (ref 4.4–11.3)

## 2025-02-12 PROCEDURE — 99284 EMERGENCY DEPT VISIT MOD MDM: CPT | Performed by: STUDENT IN AN ORGANIZED HEALTH CARE EDUCATION/TRAINING PROGRAM

## 2025-02-12 PROCEDURE — 36415 COLL VENOUS BLD VENIPUNCTURE: CPT | Performed by: STUDENT IN AN ORGANIZED HEALTH CARE EDUCATION/TRAINING PROGRAM

## 2025-02-12 PROCEDURE — 93005 ELECTROCARDIOGRAM TRACING: CPT

## 2025-02-12 PROCEDURE — 85025 COMPLETE CBC W/AUTO DIFF WBC: CPT | Performed by: STUDENT IN AN ORGANIZED HEALTH CARE EDUCATION/TRAINING PROGRAM

## 2025-02-12 PROCEDURE — 84702 CHORIONIC GONADOTROPIN TEST: CPT | Performed by: STUDENT IN AN ORGANIZED HEALTH CARE EDUCATION/TRAINING PROGRAM

## 2025-02-12 PROCEDURE — 84132 ASSAY OF SERUM POTASSIUM: CPT | Performed by: STUDENT IN AN ORGANIZED HEALTH CARE EDUCATION/TRAINING PROGRAM

## 2025-02-12 ASSESSMENT — LIFESTYLE VARIABLES
TOTAL SCORE: 0
EVER FELT BAD OR GUILTY ABOUT YOUR DRINKING: NO
HAVE PEOPLE ANNOYED YOU BY CRITICIZING YOUR DRINKING: NO
EVER HAD A DRINK FIRST THING IN THE MORNING TO STEADY YOUR NERVES TO GET RID OF A HANGOVER: NO
HAVE YOU EVER FELT YOU SHOULD CUT DOWN ON YOUR DRINKING: NO

## 2025-02-12 ASSESSMENT — PAIN SCALES - GENERAL: PAINLEVEL_OUTOF10: 0 - NO PAIN

## 2025-02-12 ASSESSMENT — PAIN - FUNCTIONAL ASSESSMENT: PAIN_FUNCTIONAL_ASSESSMENT: 0-10

## 2025-02-12 NOTE — DISCHARGE INSTRUCTIONS
Please follow-up with your primary care physician for further management of your elevated blood pressure.

## 2025-02-12 NOTE — ED PROVIDER NOTES
HPI   Chief Complaint   Patient presents with    Hypertension         History provided by:  Patient  30-year-old female presenting for elevated blood pressure.  Patient reports she was recently diagnosed with hypertension and started on lisinopril daily.  Patient has follow-up appointment later this month to reevaluate blood pressure.  Patient reports that she took her lisinopril around 830 this morning.        Patient History   Past Medical History:   Diagnosis Date    Encounter for gynecological examination (general) (routine) without abnormal findings     Pap test, as part of routine gynecological examination    Unspecified dislocation of unspecified patella, initial encounter 2021    Dislocation of patella     Past Surgical History:   Procedure Laterality Date    OTHER SURGICAL HISTORY  12/15/2020    Nasal polypectomy    OTHER SURGICAL HISTORY  12/15/2020    [place] tooth extraction    OTHER SURGICAL HISTORY  12/15/2020     section    OTHER SURGICAL HISTORY  12/15/2020    Tonsillectomy with adenoidectomy     No family history on file.  Social History     Tobacco Use    Smoking status: Not on file    Smokeless tobacco: Not on file   Substance Use Topics    Alcohol use: Not on file    Drug use: Not on file       Physical Exam   ED Triage Vitals [25 1027]   Temperature Heart Rate Respirations BP   36.4 °C (97.5 °F) 82 18 (!) 163/119      Pulse Ox Temp Source Heart Rate Source Patient Position   99 % Temporal Monitor Sitting      BP Location FiO2 (%)     Right arm --       Physical Exam  Vitals and nursing note reviewed.   Constitutional:       General: She is not in acute distress.     Appearance: Normal appearance. She is not ill-appearing.   HENT:      Head: Atraumatic.   Eyes:      Conjunctiva/sclera: Conjunctivae normal.   Cardiovascular:      Rate and Rhythm: Normal rate.   Pulmonary:      Effort: Pulmonary effort is normal. No respiratory distress.      Breath sounds: Normal breath sounds.    Abdominal:      General: There is no distension.   Musculoskeletal:         General: No deformity.      Cervical back: Normal range of motion.   Skin:     Findings: No rash.   Neurological:      Mental Status: She is alert. Mental status is at baseline.   Psychiatric:         Behavior: Behavior normal.           ED Course & MDM   Diagnoses as of 02/12/25 1339   Hypertension, unspecified type                 No data recorded                         Labs Reviewed   CBC WITH AUTO DIFFERENTIAL - Abnormal       Result Value    WBC 8.1      nRBC 0.0      RBC 5.17      Hemoglobin 16.7 (*)     Hematocrit 47.2 (*)     MCV 91      MCH 32.3      MCHC 35.4      RDW 12.5      Platelets 153      Neutrophils % 57.5      Immature Granulocytes %, Automated 0.4      Lymphocytes % 26.4      Monocytes % 8.5      Eosinophils % 6.3      Basophils % 0.9      Neutrophils Absolute 4.65      Immature Granulocytes Absolute, Automated 0.03      Lymphocytes Absolute 2.13      Monocytes Absolute 0.69      Eosinophils Absolute 0.51      Basophils Absolute 0.07     BASIC METABOLIC PANEL - Abnormal    Glucose 103 (*)     Sodium 140      Potassium 3.6      Chloride 105      Bicarbonate 27      Anion Gap 12      Urea Nitrogen 12      Creatinine 0.73      eGFR >90      Calcium 9.3     HUMAN CHORIONIC GONADOTROPIN, SERUM QUANTITATIVE - Normal    HCG, Beta-Quantitative <2      Narrative:      Total HCG measurement is performed using the [employer]   Immunoassay which detects intact HCG and free beta HCG subunit.    This test is not indicated for use as a tumor marker.   HCG testing is performed using a different test methodology at [facility] than other [facility] hospitals. Direct result comparison   should only be made within the same method.                 Medical Decision Making  Amount and/or Complexity of Data Reviewed  ECG/medicine tests: ordered and independent interpretation performed. Decision-making details  documented in ED Course.     Details: Twelve-lead ECG obtained at 1116 by my interpretation demonstrates normal sinus rhythm with a rate of 63, no acute ST elevation or depression.    Patient with recent diagnosis of hypertension presenting with elevated blood pressure.  Initial blood pressure 163/119.  Basic labs obtained to rule out endorgan damage.  No chest pain.  Normal SpO2 on room air.  Lungs are clear.  Blood pressure improved to 141/95 without treatment.  Normal renal function on metabolic panel, no significant electrolyte derangements.  HCG negative.  No acute ischemic changes on ECG.  Patient to follow-up with primary care for continued management of chronic hypertension.    Procedure  Procedures     Darwin Deutsch MD  02/12/25 1833

## 2025-02-25 ENCOUNTER — APPOINTMENT (OUTPATIENT)
Dept: PRIMARY CARE | Facility: CLINIC | Age: 30
End: 2025-02-25
Payer: COMMERCIAL

## 2025-02-25 VITALS
WEIGHT: 220 LBS | OXYGEN SATURATION: 97 % | HEIGHT: 65 IN | TEMPERATURE: 97.5 F | HEART RATE: 56 BPM | SYSTOLIC BLOOD PRESSURE: 150 MMHG | BODY MASS INDEX: 36.65 KG/M2 | DIASTOLIC BLOOD PRESSURE: 100 MMHG

## 2025-02-25 DIAGNOSIS — E88.82 CLASS 2 OBESITY DUE TO DISRUPTION OF MC4R PATHWAY WITHOUT SERIOUS COMORBIDITY WITH BODY MASS INDEX (BMI) OF 35.0 TO 35.9 IN ADULT: ICD-10-CM

## 2025-02-25 DIAGNOSIS — E66.812 CLASS 2 OBESITY DUE TO DISRUPTION OF MC4R PATHWAY WITHOUT SERIOUS COMORBIDITY WITH BODY MASS INDEX (BMI) OF 35.0 TO 35.9 IN ADULT: ICD-10-CM

## 2025-02-25 DIAGNOSIS — Z00.00 ROUTINE ADULT HEALTH MAINTENANCE: ICD-10-CM

## 2025-02-25 DIAGNOSIS — I10 PRIMARY HYPERTENSION: Primary | ICD-10-CM

## 2025-02-25 DIAGNOSIS — Z15.2 CLASS 2 OBESITY DUE TO DISRUPTION OF MC4R PATHWAY WITHOUT SERIOUS COMORBIDITY WITH BODY MASS INDEX (BMI) OF 35.0 TO 35.9 IN ADULT: ICD-10-CM

## 2025-02-25 PROCEDURE — 3077F SYST BP >= 140 MM HG: CPT | Performed by: FAMILY MEDICINE

## 2025-02-25 PROCEDURE — 1036F TOBACCO NON-USER: CPT | Performed by: FAMILY MEDICINE

## 2025-02-25 PROCEDURE — 3080F DIAST BP >= 90 MM HG: CPT | Performed by: FAMILY MEDICINE

## 2025-02-25 PROCEDURE — 3008F BODY MASS INDEX DOCD: CPT | Performed by: FAMILY MEDICINE

## 2025-02-25 PROCEDURE — 99213 OFFICE O/P EST LOW 20 MIN: CPT | Performed by: FAMILY MEDICINE

## 2025-02-25 RX ORDER — LISINOPRIL 20 MG/1
20 TABLET ORAL DAILY
Qty: 30 TABLET | Refills: 3 | Status: SHIPPED | OUTPATIENT
Start: 2025-02-25 | End: 2026-02-25

## 2025-02-25 RX ORDER — IBUPROFEN 800 MG/1
800 TABLET ORAL EVERY 8 HOURS PRN
COMMUNITY

## 2025-02-25 RX ORDER — LISINOPRIL 5 MG/1
5 TABLET ORAL DAILY
COMMUNITY
End: 2025-02-25

## 2025-02-25 ASSESSMENT — PATIENT HEALTH QUESTIONNAIRE - PHQ9
1. LITTLE INTEREST OR PLEASURE IN DOING THINGS: NOT AT ALL
2. FEELING DOWN, DEPRESSED OR HOPELESS: NOT AT ALL
SUM OF ALL RESPONSES TO PHQ9 QUESTIONS 1 AND 2: 0

## 2025-02-25 NOTE — PROGRESS NOTES
"Subjective   Patient ID: Marlee Gamboa is a 30 y.o. female who presents for Establish Care.  HPI    Patient presents in the office today to establish care. Patient was seeing Dr. Katia Garzon. Patient does not go to Dr. Garzon anymore due to not seeing her for a couple of years. Patient heard about the practice through her insurance company.      Patient presents in the office today for an ER follow up.  Patient was seen at [facility] on 2/12/25.  Patient had complaints of \"feeling off\" and light headed.   Patient was diagnosed with hypertension.   Patient was advised to follow up with PCP.   Patient has BW and EKG done while at ER.   Patient states that they feel the same since discharge.      Patient would a referral Dr. Rogers. A cardio doctor seen by her family for their heart problems.     Review of Systems  Constitutional:  no chills, no fever and no night sweats.  Eyes: no blurred vision and no eyesight problems.  ENT: no hearing loss, no nasal congestion, no hoarseness and no sore throat.  Neck: no mass (es) and no swelling.  Cardiovascular: no chest pain, no intermittent leg claudication, no lower extremity edema, no palpitation and no syncope.  Respiratory: no cough, no shortness of breath during exertion, no shortness of breath at rest and no wheezing.  Gastrointestinal: no abdominal pain, no blood in stools, no constipation, no diarrhea, no melena, no nausea, no rectal pain and no vomiting.  Genitourinary: no dysuria, no change in urinary frequency, no urinary hesitancy and no feelings of urinary urgency.  Musculoskeletal: no arthralgias, no back pain and no myalgias.  Integumentary: no new skin lesions and no rashes.  Neurological: no difficulty walking, no headache, no limb weakness, no numbness and no tingling.  Psychiatric/Behavioral: no anxiety, no depression, no anhedonia and no substance use disorders.  Endocrine: no recent weight gain and no recent weight " "loss.  Hematologic/Lymphatic: no tendency for easy bruising and no swollen glands    Objective   Physical Exam  Patient here to establish care history of high blood pressure started on 5 mg lisinopril started having headaches and went to the ER they did not change anything told to follow-up with PCP family history of both sides of hypertension.  Obese female in no acute distress physical exam today's office visit constitutional alert and oriented x3.    Head is atraumatic HEENT is within normal limits.    Neck supple no masses full range of motion.    Thyroid is normal in size no thyromegaly there is no carotid bruits.    Pulmonary exam shows clear to auscultation no respiratory distress.    Cardiovascular shows no murmur rub or gallop.  Regular rate and rhythm.    Abdominal exam soft nontender no hepatosplenomegaly or masses normal bowel sounds no rebound no guarding.    Musculoskeletal exam no joint pain no muscle pain full range of motion.    Psych exam normal mood and affect.    Dermatologic exam no skin lesions no rash no blemishes.    Neuro exam is no focal deficits.  Normal exam.    Extremities no edema normal pulses normal capillary refill.    BP (!) 150/100 (BP Location: Right leg, Patient Position: Sitting, BP Cuff Size: Adult)   Pulse 56   Temp 36.4 °C (97.5 °F) (Temporal)   Ht 1.651 m (5' 5\")   Wt 99.8 kg (220 lb)   SpO2 97%   BMI 36.61 kg/m²     Lab Results   Component Value Date    WBC 8.1 02/12/2025    HGB 16.7 (H) 02/12/2025    HCT 47.2 (H) 02/12/2025    MCV 91 02/12/2025     02/12/2025       Assessment/Plan plan is to increase lisinopril to 20 talk to her about exercise and weight loss also to minimize salt and caffeine.  Follow-up in 3 weeks.  Reviewed with her lab work and EKG which were essentially normal.  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

## 2025-03-17 ENCOUNTER — APPOINTMENT (OUTPATIENT)
Dept: OBSTETRICS AND GYNECOLOGY | Facility: CLINIC | Age: 30
End: 2025-03-17
Payer: COMMERCIAL

## 2025-03-18 ENCOUNTER — APPOINTMENT (OUTPATIENT)
Dept: PRIMARY CARE | Facility: CLINIC | Age: 30
End: 2025-03-18
Payer: COMMERCIAL

## 2025-03-18 VITALS
HEIGHT: 65 IN | WEIGHT: 219 LBS | SYSTOLIC BLOOD PRESSURE: 158 MMHG | HEART RATE: 76 BPM | OXYGEN SATURATION: 97 % | DIASTOLIC BLOOD PRESSURE: 100 MMHG | TEMPERATURE: 97.4 F | BODY MASS INDEX: 36.49 KG/M2

## 2025-03-18 DIAGNOSIS — F41.9 ANXIETY: ICD-10-CM

## 2025-03-18 DIAGNOSIS — Z13.220 LIPID SCREENING: ICD-10-CM

## 2025-03-18 DIAGNOSIS — I10 PRIMARY HYPERTENSION: ICD-10-CM

## 2025-03-18 PROCEDURE — 3080F DIAST BP >= 90 MM HG: CPT | Performed by: FAMILY MEDICINE

## 2025-03-18 PROCEDURE — 3077F SYST BP >= 140 MM HG: CPT | Performed by: FAMILY MEDICINE

## 2025-03-18 PROCEDURE — 1036F TOBACCO NON-USER: CPT | Performed by: FAMILY MEDICINE

## 2025-03-18 PROCEDURE — 99213 OFFICE O/P EST LOW 20 MIN: CPT | Performed by: FAMILY MEDICINE

## 2025-03-18 PROCEDURE — 3008F BODY MASS INDEX DOCD: CPT | Performed by: FAMILY MEDICINE

## 2025-03-18 RX ORDER — VALSARTAN AND HYDROCHLOROTHIAZIDE 80; 12.5 MG/1; MG/1
1 TABLET, FILM COATED ORAL DAILY
Qty: 30 TABLET | Refills: 3 | Status: SHIPPED | OUTPATIENT
Start: 2025-03-18 | End: 2026-03-18

## 2025-03-18 ASSESSMENT — PATIENT HEALTH QUESTIONNAIRE - PHQ9
1. LITTLE INTEREST OR PLEASURE IN DOING THINGS: NOT AT ALL
SUM OF ALL RESPONSES TO PHQ9 QUESTIONS 1 AND 2: 0
2. FEELING DOWN, DEPRESSED OR HOPELESS: NOT AT ALL

## 2025-03-18 NOTE — PROGRESS NOTES
Subjective   Patient ID: Marlee Gamboa is a 30 y.o. female who presents for No chief complaint on file..  HPI  HTN follow up  Denies chest pain,SOB, swelling, headaches, lightheadedness or dizziness.   Eats a generally healthy diet, Exercises.   Does not check BP at home.   Medication not working well.   Pt lisinopril increased to 20 mg, pt states that the lisinopril makes her feel nauseous and with headaches.     Pt also here to discuss weight loss, minimizing salt intake and caffeine    Review of Systems  Constitutional:  no chills, no fever and no night sweats.  Eyes: no blurred vision and no eyesight problems.  ENT: no hearing loss, no nasal congestion, no hoarseness and no sore throat.  Neck: no mass (es) and no swelling.  Cardiovascular: no chest pain, no intermittent leg claudication, no lower extremity edema, no palpitation and no syncope.  Respiratory: no cough, no shortness of breath during exertion, no shortness of breath at rest and no wheezing.  Gastrointestinal: no abdominal pain, no blood in stools, no constipation, no diarrhea, no melena, no nausea, no rectal pain and no vomiting.  Genitourinary: no dysuria, no change in urinary frequency, no urinary hesitancy and no feelings of urinary urgency.  Musculoskeletal: no arthralgias, no back pain and no myalgias.  Integumentary: no new skin lesions and no rashes.  Neurological: no difficulty walking, no headache, no limb weakness, no numbness and no tingling.  Psychiatric/Behavioral: no anxiety, no depression, no anhedonia and no substance use disorders.  Endocrine: no recent weight gain and no recent weight loss.  Hematologic/Lymphatic: no tendency for easy bruising and no swollen glands    Objective   Physical Exam patient in for follow-up hypertension did not tolerate the lisinopril made her nauseated blood pressure still elevated has not lost any weight well-developed well-nourished obese female in no acute distress with hypertension lungs clear  cardiac and abdominal exams benign no peripheral edema.  Plan is to start Diovan hydrochlorothiazide 80-12.5    There were no vitals taken for this visit.    Lab Results   Component Value Date    WBC 8.1 02/12/2025    HGB 16.7 (H) 02/12/2025    HCT 47.2 (H) 02/12/2025    MCV 91 02/12/2025     02/12/2025       Assessment/Plan and follow-up in 3 weeks she will get a home blood pressure monitor and bring that with her along with her readings at the next visit  Problem List Items Addressed This Visit    None

## 2025-04-14 ENCOUNTER — APPOINTMENT (OUTPATIENT)
Dept: OBSTETRICS AND GYNECOLOGY | Facility: CLINIC | Age: 30
End: 2025-04-14
Payer: COMMERCIAL

## 2025-04-18 ENCOUNTER — APPOINTMENT (OUTPATIENT)
Dept: PRIMARY CARE | Facility: CLINIC | Age: 30
End: 2025-04-18
Payer: COMMERCIAL

## 2025-04-28 ENCOUNTER — APPOINTMENT (OUTPATIENT)
Dept: PRIMARY CARE | Facility: CLINIC | Age: 30
End: 2025-04-28
Payer: COMMERCIAL

## 2025-04-28 VITALS
HEIGHT: 65 IN | SYSTOLIC BLOOD PRESSURE: 122 MMHG | DIASTOLIC BLOOD PRESSURE: 82 MMHG | BODY MASS INDEX: 37.32 KG/M2 | HEART RATE: 75 BPM | OXYGEN SATURATION: 99 % | WEIGHT: 224 LBS

## 2025-04-28 DIAGNOSIS — I10 PRIMARY HYPERTENSION: ICD-10-CM

## 2025-04-28 DIAGNOSIS — F41.9 ANXIETY: ICD-10-CM

## 2025-04-28 PROCEDURE — 3079F DIAST BP 80-89 MM HG: CPT | Performed by: FAMILY MEDICINE

## 2025-04-28 PROCEDURE — 3008F BODY MASS INDEX DOCD: CPT | Performed by: FAMILY MEDICINE

## 2025-04-28 PROCEDURE — 99213 OFFICE O/P EST LOW 20 MIN: CPT | Performed by: FAMILY MEDICINE

## 2025-04-28 PROCEDURE — 3074F SYST BP LT 130 MM HG: CPT | Performed by: FAMILY MEDICINE

## 2025-04-28 PROCEDURE — 1036F TOBACCO NON-USER: CPT | Performed by: FAMILY MEDICINE

## 2025-04-28 NOTE — PROGRESS NOTES
Subjective   Patient ID: Marlee Gamboa is a 30 y.o. female who presents for No chief complaint on file..  HPI    Patient presents in the office today for a follow up on hypertension. Patient was seen on 3/18/25 and blood pressure was 158/100. Todays blood pressure was taken on the LT arm and was 122/82.   Denies chest pain,SOB, swelling, headaches, lightheadedness or dizziness.     Currently taking Diovan HCT. Pt states when she wakes up sometime her heart will just be racing.     Review of Systems  Constitutional:  no chills, no fever and no night sweats.  Eyes: no blurred vision and no eyesight problems.  ENT: no hearing loss, no nasal congestion, no hoarseness and no sore throat.  Neck: no mass (es) and no swelling.  Cardiovascular: no chest pain, no intermittent leg claudication, no lower extremity edema, no palpitation and no syncope.  Respiratory: no cough, no shortness of breath during exertion, no shortness of breath at rest and no wheezing.  Gastrointestinal: no abdominal pain, no blood in stools, no constipation, no diarrhea, no melena, no nausea, no rectal pain and no vomiting.  Genitourinary: no dysuria, no change in urinary frequency, no urinary hesitancy and no feelings of urinary urgency.  Musculoskeletal: no arthralgias, no back pain and no myalgias.  Integumentary: no new skin lesions and no rashes.  Neurological: no difficulty walking, no headache, no limb weakness, no numbness and no tingling.  Psychiatric/Behavioral: no anxiety, no depression, no anhedonia and no substance use disorders.  Endocrine: no recent weight gain and no recent weight loss.  Hematologic/Lymphatic: no tendency for easy bruising and no swollen glands    Objective   Physical Exam  Patient in for follow-up hypertension on Diovan doing well no complaints blood pressures under good control lungs clear cardiac and abdominal exams benign recommend follow-up in 3 months to recheck blood pressure.  There were no vitals taken for  this visit.    Lab Results   Component Value Date    WBC 8.1 02/12/2025    HGB 16.7 (H) 02/12/2025    HCT 47.2 (H) 02/12/2025    MCV 91 02/12/2025     02/12/2025       Assessment/Plan   Problem List Items Addressed This Visit    None

## 2025-05-20 ENCOUNTER — HOSPITAL ENCOUNTER (EMERGENCY)
Facility: HOSPITAL | Age: 30
Discharge: HOME | End: 2025-05-20
Attending: STUDENT IN AN ORGANIZED HEALTH CARE EDUCATION/TRAINING PROGRAM
Payer: COMMERCIAL

## 2025-05-20 VITALS
SYSTOLIC BLOOD PRESSURE: 184 MMHG | WEIGHT: 220 LBS | HEIGHT: 65 IN | TEMPERATURE: 98.6 F | BODY MASS INDEX: 36.65 KG/M2 | DIASTOLIC BLOOD PRESSURE: 107 MMHG | OXYGEN SATURATION: 97 % | HEART RATE: 78 BPM | RESPIRATION RATE: 18 BRPM

## 2025-05-20 DIAGNOSIS — I10 HYPERTENSION, UNSPECIFIED TYPE: Primary | ICD-10-CM

## 2025-05-20 PROCEDURE — 99283 EMERGENCY DEPT VISIT LOW MDM: CPT | Performed by: STUDENT IN AN ORGANIZED HEALTH CARE EDUCATION/TRAINING PROGRAM

## 2025-05-20 RX ORDER — LABETALOL 100 MG/1
100 TABLET, FILM COATED ORAL 2 TIMES DAILY
Qty: 60 TABLET | Refills: 11 | Status: SHIPPED | OUTPATIENT
Start: 2025-05-20 | End: 2026-05-20

## 2025-05-20 ASSESSMENT — LIFESTYLE VARIABLES
EVER HAD A DRINK FIRST THING IN THE MORNING TO STEADY YOUR NERVES TO GET RID OF A HANGOVER: NO
TOTAL SCORE: 0
EVER FELT BAD OR GUILTY ABOUT YOUR DRINKING: NO
HAVE PEOPLE ANNOYED YOU BY CRITICIZING YOUR DRINKING: NO
HAVE YOU EVER FELT YOU SHOULD CUT DOWN ON YOUR DRINKING: NO

## 2025-05-20 ASSESSMENT — COLUMBIA-SUICIDE SEVERITY RATING SCALE - C-SSRS
6. HAVE YOU EVER DONE ANYTHING, STARTED TO DO ANYTHING, OR PREPARED TO DO ANYTHING TO END YOUR LIFE?: NO
2. HAVE YOU ACTUALLY HAD ANY THOUGHTS OF KILLING YOURSELF?: NO
1. IN THE PAST MONTH, HAVE YOU WISHED YOU WERE DEAD OR WISHED YOU COULD GO TO SLEEP AND NOT WAKE UP?: NO

## 2025-05-20 NOTE — ED PROVIDER NOTES
Emergency Department Provider Note       History of Present Illness     History provided by: Patient  Limitations to History: None  External Records Reviewed with Brief Summary: None    HPI:  Marlee Gamboa is a 30 y.o. female with a history of hypertension currently on losartan/hydrochlorothiazide presenting for medication question after finding out she was pregnant yesterday.  Patient reports that based on her last menstrual period she is approximately 7 weeks pregnant.  This is her second pregnancy.  First pregnancy ended with full-term birth via .  Patient stopped taking her blood pressure medication when she found out she was pregnant due to concerns for safety during pregnancy.  Currently asymptomatic in regards to her elevated blood pressure.  No vaginal bleeding.  No pelvic pain.    Physical Exam   Triage vitals:  T 37 °C (98.6 °F)  HR 78  BP (!) 184/107  RR 18  O2 97 % None (Room air)    Physical Exam  Vitals and nursing note reviewed.   Constitutional:       General: She is not in acute distress.     Appearance: Normal appearance. She is not ill-appearing.   HENT:      Head: Atraumatic.   Eyes:      Conjunctiva/sclera: Conjunctivae normal.   Cardiovascular:      Rate and Rhythm: Normal rate.   Pulmonary:      Effort: Pulmonary effort is normal. No respiratory distress.   Abdominal:      General: There is no distension.   Musculoskeletal:         General: No deformity.      Cervical back: Normal range of motion.   Skin:     Findings: No rash.   Neurological:      Mental Status: She is alert. Mental status is at baseline.   Psychiatric:         Behavior: Behavior normal.           Medical Decision Making & ED Course   Medical Decision Makin y.o. female presenting for medication question regarding her blood pressure medication during pregnancy.  Patient just found out she was pregnant, previously was on losartan.  Discussed stopping this medication due to concerns for teratogenicity.   Will initiate labetalol p.o. during pregnancy.  Advised to follow-up with primary care and OB/GYN for further management.  ----        ED Course:  Diagnoses as of 05/20/25 0126   Hypertension, unspecified type       Disposition   As a result of the work-up, the patient was discharged home.  she was informed of her diagnosis and instructed to come back with any concerns or worsening of condition.  she was agreeable to the plan as discussed above.  she was given the opportunity to ask questions.  All of the patient's questions were answered.    Procedures   Procedures        Darwin Deutsch MD  Emergency Medicine                                                            Darwin Deutsch MD  05/20/25 0127

## 2025-05-22 ENCOUNTER — APPOINTMENT (OUTPATIENT)
Dept: PRIMARY CARE | Facility: CLINIC | Age: 30
End: 2025-05-22
Payer: COMMERCIAL

## 2025-05-23 ENCOUNTER — APPOINTMENT (OUTPATIENT)
Dept: PRIMARY CARE | Facility: CLINIC | Age: 30
End: 2025-05-23
Payer: COMMERCIAL

## 2025-05-27 ENCOUNTER — APPOINTMENT (OUTPATIENT)
Dept: PRIMARY CARE | Facility: CLINIC | Age: 30
End: 2025-05-27
Payer: COMMERCIAL

## 2025-05-28 ENCOUNTER — APPOINTMENT (OUTPATIENT)
Dept: PRIMARY CARE | Facility: CLINIC | Age: 30
End: 2025-05-28
Payer: COMMERCIAL

## 2025-05-30 ENCOUNTER — APPOINTMENT (OUTPATIENT)
Dept: PRIMARY CARE | Facility: CLINIC | Age: 30
End: 2025-05-30
Payer: COMMERCIAL

## 2025-06-01 ENCOUNTER — HOSPITAL ENCOUNTER (EMERGENCY)
Facility: HOSPITAL | Age: 30
Discharge: HOME | End: 2025-06-01
Payer: COMMERCIAL

## 2025-06-01 ENCOUNTER — APPOINTMENT (OUTPATIENT)
Dept: RADIOLOGY | Facility: HOSPITAL | Age: 30
End: 2025-06-01
Payer: COMMERCIAL

## 2025-06-01 VITALS
TEMPERATURE: 97.9 F | DIASTOLIC BLOOD PRESSURE: 81 MMHG | WEIGHT: 220 LBS | SYSTOLIC BLOOD PRESSURE: 147 MMHG | HEART RATE: 80 BPM | RESPIRATION RATE: 16 BRPM | BODY MASS INDEX: 37.56 KG/M2 | HEIGHT: 64 IN | OXYGEN SATURATION: 99 %

## 2025-06-01 DIAGNOSIS — O20.9 VAGINAL BLEEDING IN PREGNANCY, FIRST TRIMESTER (HHS-HCC): Primary | ICD-10-CM

## 2025-06-01 LAB
ABO GROUP (TYPE) IN BLOOD: NORMAL
ALBUMIN SERPL BCP-MCNC: 4.3 G/DL (ref 3.4–5)
ALP SERPL-CCNC: 53 U/L (ref 33–110)
ALT SERPL W P-5'-P-CCNC: 136 U/L (ref 7–45)
ANION GAP SERPL CALC-SCNC: 14 MMOL/L (ref 10–20)
APPEARANCE UR: CLEAR
AST SERPL W P-5'-P-CCNC: 74 U/L (ref 9–39)
B-HCG SERPL-ACNC: ABNORMAL MIU/ML
BASOPHILS # BLD AUTO: 0.05 X10*3/UL (ref 0–0.1)
BASOPHILS NFR BLD AUTO: 0.6 %
BILIRUB SERPL-MCNC: 1.2 MG/DL (ref 0–1.2)
BILIRUB UR STRIP.AUTO-MCNC: NEGATIVE MG/DL
BUN SERPL-MCNC: 7 MG/DL (ref 6–23)
CALCIUM SERPL-MCNC: 8.8 MG/DL (ref 8.6–10.3)
CHLORIDE SERPL-SCNC: 105 MMOL/L (ref 98–107)
CO2 SERPL-SCNC: 21 MMOL/L (ref 21–32)
COLOR UR: YELLOW
CREAT SERPL-MCNC: 0.72 MG/DL (ref 0.5–1.05)
EGFRCR SERPLBLD CKD-EPI 2021: >90 ML/MIN/1.73M*2
EOSINOPHIL # BLD AUTO: 0.26 X10*3/UL (ref 0–0.7)
EOSINOPHIL NFR BLD AUTO: 3.2 %
ERYTHROCYTE [DISTWIDTH] IN BLOOD BY AUTOMATED COUNT: 11.7 % (ref 11.5–14.5)
GLUCOSE SERPL-MCNC: 144 MG/DL (ref 74–99)
GLUCOSE UR STRIP.AUTO-MCNC: NORMAL MG/DL
HCG UR QL IA.RAPID: POSITIVE
HCT VFR BLD AUTO: 47.1 % (ref 36–46)
HGB BLD-MCNC: 16.4 G/DL (ref 12–16)
IMM GRANULOCYTES # BLD AUTO: 0.02 X10*3/UL (ref 0–0.7)
IMM GRANULOCYTES NFR BLD AUTO: 0.2 % (ref 0–0.9)
KETONES UR STRIP.AUTO-MCNC: NEGATIVE MG/DL
LEUKOCYTE ESTERASE UR QL STRIP.AUTO: NEGATIVE
LYMPHOCYTES # BLD AUTO: 1.26 X10*3/UL (ref 1.2–4.8)
LYMPHOCYTES NFR BLD AUTO: 15.5 %
MCH RBC QN AUTO: 31.7 PG (ref 26–34)
MCHC RBC AUTO-ENTMCNC: 34.8 G/DL (ref 32–36)
MCV RBC AUTO: 91 FL (ref 80–100)
MONOCYTES # BLD AUTO: 0.5 X10*3/UL (ref 0.1–1)
MONOCYTES NFR BLD AUTO: 6.2 %
NEUTROPHILS # BLD AUTO: 6.02 X10*3/UL (ref 1.2–7.7)
NEUTROPHILS NFR BLD AUTO: 74.3 %
NITRITE UR QL STRIP.AUTO: NEGATIVE
NRBC BLD-RTO: 0 /100 WBCS (ref 0–0)
PH UR STRIP.AUTO: 7.5 [PH]
PLATELET # BLD AUTO: 178 X10*3/UL (ref 150–450)
POTASSIUM SERPL-SCNC: 3.8 MMOL/L (ref 3.5–5.3)
PROT SERPL-MCNC: 6.6 G/DL (ref 6.4–8.2)
PROT UR STRIP.AUTO-MCNC: NEGATIVE MG/DL
RBC # BLD AUTO: 5.17 X10*6/UL (ref 4–5.2)
RBC # UR STRIP.AUTO: NEGATIVE MG/DL
RH FACTOR (ANTIGEN D): NORMAL
SODIUM SERPL-SCNC: 136 MMOL/L (ref 136–145)
SP GR UR STRIP.AUTO: 1.02
UROBILINOGEN UR STRIP.AUTO-MCNC: ABNORMAL MG/DL
WBC # BLD AUTO: 8.1 X10*3/UL (ref 4.4–11.3)

## 2025-06-01 PROCEDURE — 36415 COLL VENOUS BLD VENIPUNCTURE: CPT | Performed by: PHYSICIAN ASSISTANT

## 2025-06-01 PROCEDURE — 76817 TRANSVAGINAL US OBSTETRIC: CPT | Performed by: RADIOLOGY

## 2025-06-01 PROCEDURE — 99284 EMERGENCY DEPT VISIT MOD MDM: CPT | Mod: 25

## 2025-06-01 PROCEDURE — 86900 BLOOD TYPING SEROLOGIC ABO: CPT | Performed by: PHYSICIAN ASSISTANT

## 2025-06-01 PROCEDURE — 81025 URINE PREGNANCY TEST: CPT | Performed by: PHYSICIAN ASSISTANT

## 2025-06-01 PROCEDURE — 76801 OB US < 14 WKS SINGLE FETUS: CPT

## 2025-06-01 PROCEDURE — 80053 COMPREHEN METABOLIC PANEL: CPT | Performed by: PHYSICIAN ASSISTANT

## 2025-06-01 PROCEDURE — 81003 URINALYSIS AUTO W/O SCOPE: CPT | Performed by: PHYSICIAN ASSISTANT

## 2025-06-01 PROCEDURE — 84702 CHORIONIC GONADOTROPIN TEST: CPT | Performed by: PHYSICIAN ASSISTANT

## 2025-06-01 PROCEDURE — 76801 OB US < 14 WKS SINGLE FETUS: CPT | Performed by: RADIOLOGY

## 2025-06-01 PROCEDURE — 85025 COMPLETE CBC W/AUTO DIFF WBC: CPT | Performed by: PHYSICIAN ASSISTANT

## 2025-06-01 ASSESSMENT — PAIN DESCRIPTION - ORIENTATION: ORIENTATION: OTHER (COMMENT)

## 2025-06-01 ASSESSMENT — PAIN DESCRIPTION - PAIN TYPE: TYPE: ACUTE PAIN

## 2025-06-01 ASSESSMENT — PAIN - FUNCTIONAL ASSESSMENT: PAIN_FUNCTIONAL_ASSESSMENT: 0-10

## 2025-06-01 ASSESSMENT — PAIN DESCRIPTION - PROGRESSION: CLINICAL_PROGRESSION: NOT CHANGED

## 2025-06-01 ASSESSMENT — PAIN SCALES - GENERAL: PAINLEVEL_OUTOF10: 2

## 2025-06-01 ASSESSMENT — PAIN DESCRIPTION - LOCATION: LOCATION: ABDOMEN

## 2025-06-01 NOTE — ED PROVIDER NOTES
HPI   Chief Complaint   Patient presents with    Vaginal Bleeding - Pregnant       30-year-old A0 female with history of hypertension and asthma presenting to the ER today with mild lower abdominal cramping, light vaginal spotting that started today.  There was no fall or injury or recent intercourse.  Patient states that she went to the bathroom today and when she wiped she noticed some light brown vaginal discharge.  She has had a little bit of mild cramping since then.  Patient tells me this is her second pregnancy, she had a  with her first.  She had an ultrasound performed last week, she thought she was 9 weeks pregnant with an LMP of 3/26 however her ultrasound showed that she is not as far along and they feel her last menstrual could have been sometime in April.  She has had some intermittent nausea and vomiting so far with this pregnancy.  She is moving her bowels regularly and not having any bloody urine, painful urination or urinary frequency.  Patient states otherwise that she is feeling well.  No further complaints at this time.      History provided by:  Patient          Patient History   Medical History[1]  Surgical History[2]  Family History[3]  Social History[4]    Physical Exam   ED Triage Vitals [25 1618]   Temperature Heart Rate Respirations BP   36.6 °C (97.9 °F) 80 16 147/81      Pulse Ox Temp src Heart Rate Source Patient Position   99 % -- -- --      BP Location FiO2 (%)     -- --       Physical Exam  Constitutional:       General: She is not in acute distress.  Eyes:      Conjunctiva/sclera: Conjunctivae normal.   Cardiovascular:      Rate and Rhythm: Normal rate and regular rhythm.      Pulses: Normal pulses.      Heart sounds: Normal heart sounds.   Pulmonary:      Effort: Pulmonary effort is normal.      Breath sounds: Normal breath sounds.   Abdominal:      General: Bowel sounds are normal. There is no distension.      Palpations: Abdomen is soft.      Tenderness: There  is no abdominal tenderness. There is no right CVA tenderness, left CVA tenderness, guarding or rebound.   Musculoskeletal:      Comments: Normal gait   Skin:     General: Skin is warm.   Neurological:      Mental Status: She is alert.      Comments: Speech normal           ED Course & MDM   Diagnoses as of 25   Vaginal bleeding in pregnancy, first trimester ([facility])                 No data recorded                                 Medical Decision Making  30-year-old A0 female with history of hypertension and asthma presenting to the ER today with mild abdominal cramping, vaginal spotting that began earlier today.  She states that she just had her hormone level checked on Thursday as well as an ultrasound.  The symptoms are new as of today.  She denies any fevers, diarrhea or constipation, dysuria, urgency, frequency or hematuria.  Patient states her pain is very mild but with the spotting she wanted to be evaluated in the ED.  She arrives afebrile, mildly hypertensive with otherwise stable vital signs.  Patient tells me she is currently on nifedipine for her blood pressure and is taking it.  On my exam heart RRR, lungs are clear and there is no CVA tenderness.  Abdomen soft nondistended nontender with normal bowel sounds.  Laboratory studies, urinalysis and ultrasound are ordered and patient agreed with this plan.    CBC with stable hemoglobin and no leukocytosis.  Urine pregnancy test is positive and urinalysis is without evidence of infection or hematuria.  Patient is Rh+ today.  His CMP with glucose of 144 but no other acute electrolyte abnormalities.  Renal function is stable.  Patient's quant is elevated greater than 43,000.  I am unable to see any previous quant's that she states she did just have 1 drawn last Thursday.  Labs otherwise within normal limits and she is pending ultrasound.    Ultrasound performed today shows a single live intrauterine pregnancy with fetal heart rate of 133.  There  is a small subchorionic hemorrhage but no other acute abnormality.  On repeat assessment patient is resting comfortably.  She has not had any increased bleeding.  I did discuss results with her today, diagnosis and treatment plan home-going.  She states that she did have blood work drawn last week and her OB is supposed to be calling her tomorrow with results I did discuss that she should notify them of her ER visit today and that she will need her quants trended and close follow-up.  I did also discussed with her warning signs to return to the emergency department and she expressed understanding and agreed with the plan of care today.      Labs Reviewed   HCG, URINE, QUALITATIVE - Abnormal       Result Value    HCG, Urine POSITIVE (*)    CBC WITH AUTO DIFFERENTIAL - Abnormal    WBC 8.1      nRBC 0.0      RBC 5.17      Hemoglobin 16.4 (*)     Hematocrit 47.1 (*)     MCV 91      MCH 31.7      MCHC 34.8      RDW 11.7      Platelets 178      Neutrophils % 74.3      Immature Granulocytes %, Automated 0.2      Lymphocytes % 15.5      Monocytes % 6.2      Eosinophils % 3.2      Basophils % 0.6      Neutrophils Absolute 6.02      Immature Granulocytes Absolute, Automated 0.02      Lymphocytes Absolute 1.26      Monocytes Absolute 0.50      Eosinophils Absolute 0.26      Basophils Absolute 0.05     COMPREHENSIVE METABOLIC PANEL - Abnormal    Glucose 144 (*)     Sodium 136      Potassium 3.8      Chloride 105      Bicarbonate 21      Anion Gap 14      Urea Nitrogen 7      Creatinine 0.72      eGFR >90      Calcium 8.8      Albumin 4.3      Alkaline Phosphatase 53      Total Protein 6.6      AST 74 (*)     Bilirubin, Total 1.2       (*)    HUMAN CHORIONIC GONADOTROPIN, SERUM QUANTITATIVE - Abnormal    HCG, Beta-Quantitative 43,555 (*)     Narrative:      Total HCG measurement is performed using the [employer]   Immunoassay which detects intact HCG and free beta HCG subunit.    This test is not indicated for  use as a tumor marker.   HCG testing is performed using a different test methodology at [facility] than other [facility]. Direct result comparison   should only be made within the same method.       URINALYSIS WITH REFLEX CULTURE AND MICROSCOPIC - Abnormal    Color, Urine Yellow      Appearance, Urine Clear      Specific Gravity, Urine 1.025      pH, Urine 7.5      Protein, Urine NEGATIVE      Glucose, Urine Normal      Blood, Urine NEGATIVE      Ketones, Urine NEGATIVE      Bilirubin, Urine NEGATIVE      Urobilinogen, Urine 3 (1+) (*)     Nitrite, Urine NEGATIVE      Leukocyte Esterase, Urine NEGATIVE     ABORH    ABO TYPE O      Rh TYPE POS     URINALYSIS WITH REFLEX CULTURE AND MICROSCOPIC    Narrative:     The following orders were created for panel order Urinalysis with Reflex Culture and Microscopic.  Procedure                               Abnormality         Status                     ---------                               -----------         ------                     Urinalysis with Reflex C...[[id number]]  Abnormal            Final result               Extra Urine Gray Tube[[id number]]                            In process                   Please view results for these tests on the individual orders.   EXTRA URINE GRAY TUBE       US PELVIS OB TRANSABDOMINAL W TRANSVAGINAL UP TO 1ST TRIMESTER   Final Result   1. Single live intrauterine gestation corresponding to 6 weeks 4 days   weeks,  days. Follow up examination is recommended at 18-20 weeks   gestation for evaluation of fetal anatomy.   2. Small subchorionic hemorrhage hemorrhage.        MACRO:   None        Signed by: Quintin Silvestre 6/1/2025 6:39 PM   Dictation workstation:   [id number]            Procedure  Procedures         [1]   Past Medical History:  Diagnosis Date    Encounter for gynecological examination (general) (routine) without abnormal findings     Pap test, as part of routine gynecological examination     Hypertension     Unspecified dislocation of unspecified patella, initial encounter 2021    Dislocation of patella   [2]   Past Surgical History:  Procedure Laterality Date    OTHER SURGICAL HISTORY  12/15/2020    Nasal polypectomy    OTHER SURGICAL HISTORY  12/15/2020    [place] tooth extraction    OTHER SURGICAL HISTORY  12/15/2020     section    OTHER SURGICAL HISTORY  12/15/2020    Tonsillectomy with adenoidectomy   [3]   Family History  Problem Relation Name Age of Onset    Hypertension Mother      Hypertension Father      Hypertension Paternal Grandmother      Hyperlipidemia Paternal Grandmother     [4]   Social History  Tobacco Use    Smoking status: Former     Types: Cigarettes     Passive exposure: Never    Smokeless tobacco: Never   Substance Use Topics    Alcohol use: Not on file    Drug use: Not on file        Keena Arteaga PA-C  25 1900

## 2025-06-02 LAB — HOLD SPECIMEN: NORMAL

## 2025-06-10 ENCOUNTER — APPOINTMENT (OUTPATIENT)
Dept: OBSTETRICS AND GYNECOLOGY | Facility: CLINIC | Age: 30
End: 2025-06-10
Payer: COMMERCIAL

## 2025-06-18 ENCOUNTER — HOSPITAL ENCOUNTER (EMERGENCY)
Facility: HOSPITAL | Age: 30
Discharge: HOME | End: 2025-06-19
Payer: COMMERCIAL

## 2025-06-18 ENCOUNTER — APPOINTMENT (OUTPATIENT)
Dept: RADIOLOGY | Facility: HOSPITAL | Age: 30
End: 2025-06-18
Payer: COMMERCIAL

## 2025-06-18 VITALS
WEIGHT: 220 LBS | RESPIRATION RATE: 16 BRPM | DIASTOLIC BLOOD PRESSURE: 88 MMHG | BODY MASS INDEX: 36.65 KG/M2 | TEMPERATURE: 97.9 F | SYSTOLIC BLOOD PRESSURE: 148 MMHG | HEART RATE: 97 BPM | HEIGHT: 65 IN | OXYGEN SATURATION: 97 %

## 2025-06-18 DIAGNOSIS — O46.90 VAGINAL BLEEDING IN PREGNANCY (HHS-HCC): Primary | ICD-10-CM

## 2025-06-18 LAB
ALBUMIN SERPL BCP-MCNC: 4.1 G/DL (ref 3.4–5)
ALP SERPL-CCNC: 45 U/L (ref 33–110)
ALT SERPL W P-5'-P-CCNC: 68 U/L (ref 7–45)
ANION GAP SERPL CALC-SCNC: 11 MMOL/L (ref 10–20)
AST SERPL W P-5'-P-CCNC: 41 U/L (ref 9–39)
B-HCG SERPL-ACNC: ABNORMAL MIU/ML
BASOPHILS # BLD AUTO: 0.03 X10*3/UL (ref 0–0.1)
BASOPHILS NFR BLD AUTO: 0.3 %
BILIRUB SERPL-MCNC: 0.7 MG/DL (ref 0–1.2)
BUN SERPL-MCNC: 9 MG/DL (ref 6–23)
CALCIUM SERPL-MCNC: 8.8 MG/DL (ref 8.6–10.3)
CHLORIDE SERPL-SCNC: 105 MMOL/L (ref 98–107)
CO2 SERPL-SCNC: 24 MMOL/L (ref 21–32)
CREAT SERPL-MCNC: 0.71 MG/DL (ref 0.5–1.05)
EGFRCR SERPLBLD CKD-EPI 2021: >90 ML/MIN/1.73M*2
EOSINOPHIL # BLD AUTO: 0.27 X10*3/UL (ref 0–0.7)
EOSINOPHIL NFR BLD AUTO: 2.7 %
ERYTHROCYTE [DISTWIDTH] IN BLOOD BY AUTOMATED COUNT: 11.5 % (ref 11.5–14.5)
GLUCOSE SERPL-MCNC: 100 MG/DL (ref 74–99)
HCT VFR BLD AUTO: 40.6 % (ref 36–46)
HGB BLD-MCNC: 14.3 G/DL (ref 12–16)
IMM GRANULOCYTES # BLD AUTO: 0.06 X10*3/UL (ref 0–0.7)
IMM GRANULOCYTES NFR BLD AUTO: 0.6 % (ref 0–0.9)
LYMPHOCYTES # BLD AUTO: 1.73 X10*3/UL (ref 1.2–4.8)
LYMPHOCYTES NFR BLD AUTO: 17.5 %
MCH RBC QN AUTO: 31.4 PG (ref 26–34)
MCHC RBC AUTO-ENTMCNC: 35.2 G/DL (ref 32–36)
MCV RBC AUTO: 89 FL (ref 80–100)
MONOCYTES # BLD AUTO: 0.53 X10*3/UL (ref 0.1–1)
MONOCYTES NFR BLD AUTO: 5.4 %
NEUTROPHILS # BLD AUTO: 7.26 X10*3/UL (ref 1.2–7.7)
NEUTROPHILS NFR BLD AUTO: 73.5 %
NRBC BLD-RTO: 0 /100 WBCS (ref 0–0)
PLATELET # BLD AUTO: 160 X10*3/UL (ref 150–450)
POTASSIUM SERPL-SCNC: 3.7 MMOL/L (ref 3.5–5.3)
PROT SERPL-MCNC: 6.4 G/DL (ref 6.4–8.2)
RBC # BLD AUTO: 4.55 X10*6/UL (ref 4–5.2)
SODIUM SERPL-SCNC: 136 MMOL/L (ref 136–145)
WBC # BLD AUTO: 9.9 X10*3/UL (ref 4.4–11.3)

## 2025-06-18 PROCEDURE — 99284 EMERGENCY DEPT VISIT MOD MDM: CPT | Mod: 25

## 2025-06-18 PROCEDURE — 76801 OB US < 14 WKS SINGLE FETUS: CPT

## 2025-06-18 PROCEDURE — 36415 COLL VENOUS BLD VENIPUNCTURE: CPT | Performed by: PHYSICIAN ASSISTANT

## 2025-06-18 PROCEDURE — 76817 TRANSVAGINAL US OBSTETRIC: CPT | Performed by: RADIOLOGY

## 2025-06-18 PROCEDURE — 80053 COMPREHEN METABOLIC PANEL: CPT | Performed by: PHYSICIAN ASSISTANT

## 2025-06-18 PROCEDURE — 85025 COMPLETE CBC W/AUTO DIFF WBC: CPT | Performed by: PHYSICIAN ASSISTANT

## 2025-06-18 PROCEDURE — 84702 CHORIONIC GONADOTROPIN TEST: CPT | Performed by: PHYSICIAN ASSISTANT

## 2025-06-18 PROCEDURE — 76815 OB US LIMITED FETUS(S): CPT | Performed by: RADIOLOGY

## 2025-06-18 ASSESSMENT — PAIN - FUNCTIONAL ASSESSMENT: PAIN_FUNCTIONAL_ASSESSMENT: 0-10

## 2025-06-18 ASSESSMENT — PAIN SCALES - GENERAL: PAINLEVEL_OUTOF10: 0 - NO PAIN

## 2025-06-19 NOTE — ED PROVIDER NOTES
HPI   Chief Complaint   Patient presents with    Vaginal Bleeding - Pregnant     I'm having bleeding when I wipe again, I was here a couple weeks ago for the same thing, but I dont see my OB til Monday.       This is a 30-year-old G3,  female currently 9 weeks pregnant presenting for evaluation of vaginal bleeding that she noticed when wiping.  She had been engaging in activity that was more than she usually does today and then noticed the bleeding.  States she had an ultrasound about a month ago that showed a small hematoma and has not been having any worsening bleeding from there.  Denies abdominal pain, nausea, vomiting, diarrhea dysuria, hematuria, urinary frequency.      History provided by:  Patient   used: No            Patient History   Medical History[1]  Surgical History[2]  Family History[3]  Social History[4]    Physical Exam   ED Triage Vitals [25]   Temperature Heart Rate Respirations BP   36.6 °C (97.9 °F) 97 16 148/88      Pulse Ox Temp Source Heart Rate Source Patient Position   97 % Temporal Monitor Sitting      BP Location FiO2 (%)     Right arm --       Physical Exam    General: Vitals noted, no distress. Afebrile.  High BMI.  EENT: Posterior oropharynx patent and unremarkable.  Cardiac: Regular rate and rhythm. No murmur.  Pulmonary: Lungs clear bilaterally with good aeration. No adventitious breath sounds.   Abdomen: Soft, nontender. No rebound. No guarding.  : No CVA tenderness. Exam deferred    ED Course & MDM   Diagnoses as of 25 0013   Vaginal bleeding in pregnancy ([facility])                 No data recorded                                 Medical Decision Making  DDx: threatened AB, miscarriage, subchorionic hemorrhage, cervicitis, PID    Physical exam as above.  Nonsurgical abdomen.  Visibly nontoxic-appearing no apparent distress.  Review of from prior workup shows her blood typing is O+.  Her [place] 13270 and her glucose is 100 her AST  is slightly bumped and her CBC is overall normal and her ultrasound shows a single live IUP gestational age estimated at 8 weeks 6 days with a fetal heart rate of 149.  Patient was reassured has had no further bleeding here and is going to follow-up with her OB/GYN on Monday.  Instructed to return to the nearest ED if any concerns or new or worsening symptoms. Patient verbalized understanding and agreement with plan. Discharged in stable condition.      Disclaimer: This note was dictated using speech recognition software. An attempt at proofreading was made to minimize errors. Minor errors in transcription may be present.    Amount and/or Complexity of Data Reviewed  Labs: ordered.  Radiology: ordered.        Procedure  Procedures       [1]   Past Medical History:  Diagnosis Date    Encounter for gynecological examination (general) (routine) without abnormal findings     Pap test, as part of routine gynecological examination    Hypertension     Unspecified dislocation of unspecified patella, initial encounter 2021    Dislocation of patella   [2]   Past Surgical History:  Procedure Laterality Date    OTHER SURGICAL HISTORY  12/15/2020    Nasal polypectomy    OTHER SURGICAL HISTORY  12/15/2020    [place] tooth extraction    OTHER SURGICAL HISTORY  12/15/2020     section    OTHER SURGICAL HISTORY  12/15/2020    Tonsillectomy with adenoidectomy   [3]   Family History  Problem Relation Name Age of Onset    Hypertension Mother      Hypertension Father      Hypertension Paternal Grandmother      Hyperlipidemia Paternal Grandmother     [4]   Social History  Tobacco Use    Smoking status: Former     Types: Cigarettes     Passive exposure: Never    Smokeless tobacco: Never   Substance Use Topics    Alcohol use: Not on file    Drug use: Not on file        Gino Gomez PA-C  25 0015

## 2025-06-30 ENCOUNTER — APPOINTMENT (OUTPATIENT)
Dept: PRIMARY CARE | Facility: CLINIC | Age: 30
End: 2025-06-30
Payer: COMMERCIAL

## 2025-06-30 VITALS
HEART RATE: 78 BPM | BODY MASS INDEX: 37.82 KG/M2 | OXYGEN SATURATION: 99 % | HEIGHT: 65 IN | DIASTOLIC BLOOD PRESSURE: 68 MMHG | WEIGHT: 227 LBS | SYSTOLIC BLOOD PRESSURE: 122 MMHG

## 2025-06-30 DIAGNOSIS — E88.82 CLASS 2 OBESITY DUE TO DISRUPTION OF MC4R PATHWAY WITHOUT SERIOUS COMORBIDITY WITH BODY MASS INDEX (BMI) OF 35.0 TO 35.9 IN ADULT: ICD-10-CM

## 2025-06-30 DIAGNOSIS — J45.20 MILD INTERMITTENT ASTHMA WITHOUT COMPLICATION (HHS-HCC): ICD-10-CM

## 2025-06-30 DIAGNOSIS — E66.812 CLASS 2 OBESITY DUE TO DISRUPTION OF MC4R PATHWAY WITHOUT SERIOUS COMORBIDITY WITH BODY MASS INDEX (BMI) OF 35.0 TO 35.9 IN ADULT: ICD-10-CM

## 2025-06-30 DIAGNOSIS — I10 PRIMARY HYPERTENSION: ICD-10-CM

## 2025-06-30 DIAGNOSIS — F31.9 BIPOLAR 1 DISORDER (MULTI): Primary | ICD-10-CM

## 2025-06-30 DIAGNOSIS — F41.9 ANXIETY: ICD-10-CM

## 2025-06-30 DIAGNOSIS — Z15.2 CLASS 2 OBESITY DUE TO DISRUPTION OF MC4R PATHWAY WITHOUT SERIOUS COMORBIDITY WITH BODY MASS INDEX (BMI) OF 35.0 TO 35.9 IN ADULT: ICD-10-CM

## 2025-06-30 PROCEDURE — 3008F BODY MASS INDEX DOCD: CPT | Performed by: FAMILY MEDICINE

## 2025-06-30 PROCEDURE — 99213 OFFICE O/P EST LOW 20 MIN: CPT | Performed by: FAMILY MEDICINE

## 2025-06-30 PROCEDURE — 1036F TOBACCO NON-USER: CPT | Performed by: FAMILY MEDICINE

## 2025-06-30 PROCEDURE — 3074F SYST BP LT 130 MM HG: CPT | Performed by: FAMILY MEDICINE

## 2025-06-30 PROCEDURE — 3078F DIAST BP <80 MM HG: CPT | Performed by: FAMILY MEDICINE

## 2025-06-30 RX ORDER — NIFEDIPINE 30 MG/1
1 TABLET, EXTENDED RELEASE ORAL
COMMUNITY
Start: 2025-06-16

## 2025-06-30 ASSESSMENT — PATIENT HEALTH QUESTIONNAIRE - PHQ9
SUM OF ALL RESPONSES TO PHQ9 QUESTIONS 1 AND 2: 0
2. FEELING DOWN, DEPRESSED OR HOPELESS: NOT AT ALL
1. LITTLE INTEREST OR PLEASURE IN DOING THINGS: NOT AT ALL

## 2025-06-30 NOTE — PROGRESS NOTES
Subjective   Patient ID: Marlee Gamboa is a 30 y.o. female who presents for No chief complaint on file..  HPI    Patient presents in the office today for a follow up on hypertension. Patient was seen on 4/215/25 and blood pressure was 122/82. Todays blood pressure was taken on the LT arm and was 122/68  Denies chest pain,SOB, swelling, headaches, lightheadedness or dizziness.      Currently taking Nifedipine due to patient being pregnant since last visit. OB updated her medication and states it has been working for her well. .           Review of Systems  Constitutional:  no chills, no fever and no night sweats.  Eyes: no blurred vision and no eyesight problems.  ENT: no hearing loss, no nasal congestion, no hoarseness and no sore throat.  Neck: no mass (es) and no swelling.  Cardiovascular: no chest pain, no intermittent leg claudication, no lower extremity edema, no palpitation and no syncope.  Respiratory: no cough, no shortness of breath during exertion, no shortness of breath at rest and no wheezing.  Gastrointestinal: no abdominal pain, no blood in stools, no constipation, no diarrhea, no melena, no nausea, no rectal pain and no vomiting.  Genitourinary: no dysuria, no change in urinary frequency, no urinary hesitancy and no feelings of urinary urgency.  Musculoskeletal: no arthralgias, no back pain and no myalgias.  Integumentary: no new skin lesions and no rashes.  Neurological: no difficulty walking, no headache, no limb weakness, no numbness and no tingling.  Psychiatric/Behavioral: no anxiety, no depression, no anhedonia and no substance use disorders.  Endocrine: no recent weight gain and no recent weight loss.  Hematologic/Lymphatic: no tendency for easy bruising and no swollen glands    Objective   Physical Exam  Patient in for follow-up hypertension currently 11 weeks pregnant obese switched her to nifedipine extended release 30 mg so far blood pressure is good off the labetalol and valsartan.   Please female in no acute distress lungs clear cardiac and abdominal exams are benign no peripheral edema.  She is seeing maternal-fetal medicine later this week and then will follow-up with her treating OB care with her obstetrician.  Routine recheck with me in 6 months or as needed  There were no vitals taken for this visit.    Lab Results   Component Value Date    WBC 9.9 06/18/2025    HGB 14.3 06/18/2025    HCT 40.6 06/18/2025    MCV 89 06/18/2025     06/18/2025       Assessment/Plan   Problem List Items Addressed This Visit    None

## 2025-07-14 ENCOUNTER — HOSPITAL ENCOUNTER (OUTPATIENT)
Dept: RADIOLOGY | Facility: CLINIC | Age: 30
Discharge: HOME | End: 2025-07-14
Payer: COMMERCIAL

## 2025-07-14 ENCOUNTER — CONSULT (OUTPATIENT)
Dept: MATERNAL FETAL MEDICINE | Facility: CLINIC | Age: 30
End: 2025-07-14
Payer: COMMERCIAL

## 2025-07-14 VITALS
HEART RATE: 71 BPM | BODY MASS INDEX: 37.32 KG/M2 | WEIGHT: 224 LBS | HEIGHT: 65 IN | SYSTOLIC BLOOD PRESSURE: 115 MMHG | DIASTOLIC BLOOD PRESSURE: 78 MMHG

## 2025-07-14 DIAGNOSIS — O09.299 HISTORY OF GESTATIONAL DIABETES IN PRIOR PREGNANCY, CURRENTLY PREGNANT (HHS-HCC): ICD-10-CM

## 2025-07-14 DIAGNOSIS — O09.299 HISTORY OF MACROSOMIA IN INFANT IN PRIOR PREGNANCY, CURRENTLY PREGNANT (HHS-HCC): ICD-10-CM

## 2025-07-14 DIAGNOSIS — Z86.32 HISTORY OF GESTATIONAL DIABETES IN PRIOR PREGNANCY, CURRENTLY PREGNANT (HHS-HCC): ICD-10-CM

## 2025-07-14 DIAGNOSIS — O10.919 CHRONIC HYPERTENSION AFFECTING PREGNANCY (HHS-HCC): Primary | ICD-10-CM

## 2025-07-14 DIAGNOSIS — O34.219 HISTORY OF CESAREAN DELIVERY AFFECTING PREGNANCY (HHS-HCC): ICD-10-CM

## 2025-07-14 DIAGNOSIS — Z3A.12 12 WEEKS GESTATION OF PREGNANCY (HHS-HCC): ICD-10-CM

## 2025-07-14 DIAGNOSIS — O99.210 OBESITY IN PREGNANCY (HHS-HCC): ICD-10-CM

## 2025-07-14 DIAGNOSIS — Z3A.10 10 WEEKS GESTATION OF PREGNANCY (HHS-HCC): ICD-10-CM

## 2025-07-14 PROCEDURE — 99204 OFFICE O/P NEW MOD 45 MIN: CPT | Performed by: STUDENT IN AN ORGANIZED HEALTH CARE EDUCATION/TRAINING PROGRAM

## 2025-07-14 PROCEDURE — 99214 OFFICE O/P EST MOD 30 MIN: CPT | Mod: 25 | Performed by: STUDENT IN AN ORGANIZED HEALTH CARE EDUCATION/TRAINING PROGRAM

## 2025-07-14 PROCEDURE — 36415 COLL VENOUS BLD VENIPUNCTURE: CPT | Performed by: STUDENT IN AN ORGANIZED HEALTH CARE EDUCATION/TRAINING PROGRAM

## 2025-07-14 PROCEDURE — 76813 OB US NUCHAL MEAS 1 GEST: CPT

## 2025-07-14 PROCEDURE — 76801 OB US < 14 WKS SINGLE FETUS: CPT

## 2025-07-14 PROCEDURE — 76813 OB US NUCHAL MEAS 1 GEST: CPT | Performed by: STUDENT IN AN ORGANIZED HEALTH CARE EDUCATION/TRAINING PROGRAM

## 2025-07-14 ASSESSMENT — PAIN SCALES - GENERAL: PAINLEVEL_OUTOF10: 0-NO PAIN

## 2025-07-14 NOTE — ASSESSMENT & PLAN NOTE
-History of diet-controlled GDM complicated by fetal macrosomia with birth weight of 4.68 kg  -We reviewed the increased risk for recurrence and life-long diabetes risk   -Hemoglobin A1c ordered today for early screening  -Recommend 1 hour GTT at 24-28 weeks  -Please refer back to MFM if the patient does develop gestational diabetes

## 2025-07-14 NOTE — ASSESSMENT & PLAN NOTE
-Recommend a total weight gain of no more than 11-20lb during this pregnancy  -Fetal growth will be followed per cHTN guidelines

## 2025-07-14 NOTE — PROGRESS NOTES
"2025   Marlee Gamboa     [facility] CONSULT NOTE  Referring Clinician: MICHAEL Hamlin  Reason for consult: chronic hypertension     HPI: Marlee Gamboa is a 30 y.o.  at 12w5d here for consult for chronic hypertension.     Patient reports that she was diagnosed with high blood pressure earlier this year and has a family history of it. She started on lisinopril but didn't tolerate it very well. She switched to labetalol at about 4 weeks along with this pregnancy and took that for one week. She was then told to switch to nifedipine due to her history of asthma, and she has been taking nifedipine for around 2 months. She states that her blood pressures are now normal at home and at office visits. The highest she'll ever see are like 120s/80-90s. She says the primary OB has checked for urine protein and it was negative. She has an aspirin prescription but wasn't sure when to start it.     She has a history of asthma but notes that it doesn't require medications.     She had gestational diabetes with her last pregnancy and the baby weighed over 10lb at birth. She checked her blood sugars and did not use treatment for diabetes. She has discussed delivery route with her primary provider and desires a repeat  delivery.     The patient's medical, surgical, and social history were reviewed and updated in the history tabs as below.     OB History          2    Para   1    Term   1            AB        Living   1         SAB        IAB        Ectopic        Multiple        Live Births   1                 Medical History[1]    Surgical History[2]    Medications: nifedipine 30mg daily, prenatal gummy, has aspirin but hasn't started it, inhaler as needed    RX Allergies[3]    Social History[4]    family history includes Hyperlipidemia in her paternal grandmother; Hypertension in her father, mother, and paternal grandmother.      OBJECTIVE  Visit Vitals  /78   Pulse 71   Ht 1.651 m (5' 5\") "   Wt 102 kg (224 lb)   LMP  (LMP Unknown)   BMI 37.28 kg/m²   OB Status Pregnant   Smoking Status Former   BSA 2.16 m²       Physical exam  Well appearing  Unlabored respirations  No edema  Mood/affect appropriate   FHT: see US report     ASSESSMENT & PLAN    Marlee Gamboa is a 30 y.o.  at 12w5d here for the following:    Assessment & Plan  Chronic hypertension affecting pregnancy ([facility])  The patient has a known history of chronic hypertension. She was diagnosed in . She has no known history of end-organ manifestations and is currently well-controlled on nifedipine XR 30mg daily. Her serum Cr was 0.71 last month.     Pregnancy outcomes in women with chronic hypertension depend largely upon the degree of blood pressure control and prior end organ damage.  Risks associated with hypertension in pregnancy include superimposed preeclampsia, abruption, growth restriction, and  delivery, among others.  Continued efforts to optimize her hypertension control will remain important life-long.      In the setting of CHTN in pregnancy, we recommend:  - Daily low-dose aspirin (162mg daily), to start now  - Baseline CBC, CMP and urine protein assessment before 20 weeks  - Detailed anatomy scan  - Serial assessments of fetal growth at 28, 32, and 36 weeks  - Once-weekly  testing starting at 32 weeks if on antihypertensive medications.  - Delivery at 37-39 weeks if on medications and 38-39 weeks if not on medications.   - Evaluations for preeclampsia with evidence of BP elevations (>160 mmHg systolic or >110 mmHg diastolic) or preeclampsia symptoms after 20 weeks' gestation.     History of gestational diabetes in prior pregnancy, currently pregnant ([facility])  History of macrosomia in infant in prior pregnancy, currently pregnant ([facility])  -History of diet-controlled GDM complicated by fetal macrosomia with birth weight of 4.68 kg  -We reviewed the increased risk for recurrence and life-long diabetes  risk   -Hemoglobin A1c ordered today for early screening  -Recommend 1 hour GTT at 24-28 weeks  -Please refer back to MFM if the patient does develop gestational diabetes    History of  delivery affecting pregnancy ([facility])  -Unable to view last operative report  -The patient reports that her primary OB has counseled her on delivery route including a trial of labor after , and she is opting for repeat  delivery    Obesity in pregnancy ([facility])  -Recommend a total weight gain of no more than 11-20lb during this pregnancy  -Fetal growth will be followed per cHTN guidelines    12 weeks gestation of pregnancy ([facility])  -Continue care per primary OB  -Cell free DNA screening for the common aneuploidies was desired and drawn today       Thank you for allowing us to participate in the care of your patient. We anticipate she should be able to continue her care under your supervision. Please do not hesitate to contact us should any concerns arise.    Nava Blackwell MD   Maternal Fetal Medicine         [1]   Past Medical History:  Diagnosis Date    Anxiety     Asthma     Bipolar 1 disorder (Multi)     Gestational diabetes     Hypertension     Unspecified dislocation of unspecified patella, initial encounter 2021    Dislocation of patella   [2]   Past Surgical History:  Procedure Laterality Date    OTHER SURGICAL HISTORY  12/15/2020    Nasal polypectomy    OTHER SURGICAL HISTORY  12/15/2020    [place] tooth extraction    OTHER SURGICAL HISTORY  12/15/2020     section    OTHER SURGICAL HISTORY  12/15/2020    Tonsillectomy with adenoidectomy   [3]   Allergies  Allergen Reactions    Lisinopril Headache and Nausea/vomiting    Bee Pollen Other    Latex Other    Latex, Natural Rubber Itching    Mushroom Unknown    Morphine Itching   [4]   Social History  Tobacco Use    Smoking status: Former     Types: Cigarettes     Passive exposure: Never    Smokeless tobacco: Never   Vaping Use    Vaping  status: Never Used   Substance Use Topics    Alcohol use: Not Currently    Drug use: Not Currently     Types: Marijuana

## 2025-07-14 NOTE — LETTER
2025     No Recipients    Patient: Marlee Gamboa   YOB: 1995   Date of Visit: 2025       Dear Dr. Ghosh Recipients:    Thank you for referring Marlee Gamboa to me for evaluation. Below are my notes for this consultation.  If you have questions, please do not hesitate to call me. I look forward to following your patient along with you.       Sincerely,     Nava Blackwell MD      CC: No Recipients  ______________________________________________________________________________________    2025   Marlee Gamboa     M CONSULT NOTE  Referring Clinician: MICHAEL Hamlin  Reason for consult: chronic hypertension     HPI: Marlee Gamboa is a 30 y.o.  at 12w5d here for consult for chronic hypertension.     Patient reports that she was diagnosed with high blood pressure earlier this year and has a family history of it. She started on lisinopril but didn't tolerate it very well. She switched to labetalol at about 4 weeks along with this pregnancy and took that for one week. She was then told to switch to nifedipine due to her history of asthma, and she has been taking nifedipine for around 2 months. She states that her blood pressures are now normal at home and at office visits. The highest she'll ever see are like 120s/80-90s. She says the primary OB has checked for urine protein and it was negative. She has an aspirin prescription but wasn't sure when to start it.     She has a history of asthma but notes that it doesn't require medications.     She had gestational diabetes with her last pregnancy and the baby weighed over 10lb at birth. She checked her blood sugars and did not use treatment for diabetes. She has discussed delivery route with her primary provider and desires a repeat  delivery.     The patient's medical, surgical, and social history were reviewed and updated in the history tabs as below.     OB History          2    Para   1    Term   1     "        AB        Living   1         SAB        IAB        Ectopic        Multiple        Live Births   1                 Medical History[1]    Surgical History[2]    Medications: nifedipine 30mg daily, prenatal gummy, has aspirin but hasn't started it, inhaler as needed    RX Allergies[3]    Social History[4]    family history includes Hyperlipidemia in her paternal grandmother; Hypertension in her father, mother, and paternal grandmother.      OBJECTIVE  Visit Vitals  /78   Pulse 71   Ht 1.651 m (5' 5\")   Wt 102 kg (224 lb)   LMP  (LMP Unknown)   BMI 37.28 kg/m²   OB Status Pregnant   Smoking Status Former   BSA 2.16 m²       Physical exam  Well appearing  Unlabored respirations  No edema  Mood/affect appropriate   FHT: see US report     ASSESSMENT & PLAN    Marlee Gamboa is a 30 y.o.  at 12w5d here for the following:    Assessment & Plan  Chronic hypertension affecting pregnancy ([facility])  The patient has a known history of chronic hypertension. She was diagnosed in . She has no known history of end-organ manifestations and is currently well-controlled on nifedipine XR 30mg daily. Her serum Cr was 0.71 last month.     Pregnancy outcomes in women with chronic hypertension depend largely upon the degree of blood pressure control and prior end organ damage.  Risks associated with hypertension in pregnancy include superimposed preeclampsia, abruption, growth restriction, and  delivery, among others.  Continued efforts to optimize her hypertension control will remain important life-long.      In the setting of CHTN in pregnancy, we recommend:  - Daily low-dose aspirin (162mg daily), to start now  - Baseline CBC, CMP and urine protein assessment before 20 weeks  - Detailed anatomy scan  - Serial assessments of fetal growth at 28, 32, and 36 weeks  - Once-weekly  testing starting at 32 weeks if on antihypertensive medications.  - Delivery at 37-39 weeks if on medications and " 38-39 weeks if not on medications.   - Evaluations for preeclampsia with evidence of BP elevations (>160 mmHg systolic or >110 mmHg diastolic) or preeclampsia symptoms after 20 weeks' gestation.     History of gestational diabetes in prior pregnancy, currently pregnant ([facility])  History of macrosomia in infant in prior pregnancy, currently pregnant ([facility])  -History of diet-controlled GDM complicated by fetal macrosomia with birth weight of 4.68 kg  -We reviewed the increased risk for recurrence and life-long diabetes risk   -Hemoglobin A1c ordered today for early screening  -Recommend 1 hour GTT at 24-28 weeks  -Please refer back to MFM if the patient does develop gestational diabetes    History of  delivery affecting pregnancy ([facility])  -Unable to view last operative report  -The patient reports that her primary OB has counseled her on delivery route including a trial of labor after , and she is opting for repeat  delivery    Obesity in pregnancy ([facility])  -Recommend a total weight gain of no more than 11-20lb during this pregnancy  -Fetal growth will be followed per cHTN guidelines    12 weeks gestation of pregnancy ([facility])  -Continue care per primary OB  -Cell free DNA screening for the common aneuploidies was desired and drawn today       Thank you for allowing us to participate in the care of your patient. We anticipate she should be able to continue her care under your supervision. Please do not hesitate to contact us should any concerns arise.    Nava Blackwell MD   Maternal Fetal Medicine         [1]  Past Medical History:  Diagnosis Date   • Anxiety    • Asthma    • Bipolar 1 disorder (Multi)    • Gestational diabetes    • Hypertension    • Unspecified dislocation of unspecified patella, initial encounter 2021    Dislocation of patella   [2]  Past Surgical History:  Procedure Laterality Date   • OTHER SURGICAL HISTORY  12/15/2020    Nasal polypectomy   • OTHER SURGICAL  HISTORY  12/15/2020    [place] tooth extraction   • OTHER SURGICAL HISTORY  12/15/2020     section   • OTHER SURGICAL HISTORY  12/15/2020    Tonsillectomy with adenoidectomy   [3]  Allergies  Allergen Reactions   • Lisinopril Headache and Nausea/vomiting   • Bee Pollen Other   • Latex Other   • Latex, Natural Rubber Itching   • Mushroom Unknown   • Morphine Itching   [4]  Social History  Tobacco Use   • Smoking status: Former     Types: Cigarettes     Passive exposure: Never   • Smokeless tobacco: Never   Vaping Use   • Vaping status: Never Used   Substance Use Topics   • Alcohol use: Not Currently   • Drug use: Not Currently     Types: Marijuana

## 2025-07-14 NOTE — ASSESSMENT & PLAN NOTE
The patient has a known history of chronic hypertension. She was diagnosed in . She has no known history of end-organ manifestations and is currently well-controlled on nifedipine XR 30mg daily. Her serum Cr was 0.71 last month.     Pregnancy outcomes in women with chronic hypertension depend largely upon the degree of blood pressure control and prior end organ damage.  Risks associated with hypertension in pregnancy include superimposed preeclampsia, abruption, growth restriction, and  delivery, among others.  Continued efforts to optimize her hypertension control will remain important life-long.      In the setting of CHTN in pregnancy, we recommend:  - Daily low-dose aspirin (162mg daily), to start now  - Baseline CBC, CMP and urine protein assessment before 20 weeks  - Detailed anatomy scan  - Serial assessments of fetal growth at 28, 32, and 36 weeks  - Once-weekly  testing starting at 32 weeks if on antihypertensive medications.  - Delivery at 37-39 weeks if on medications and 38-39 weeks if not on medications.   - Evaluations for preeclampsia with evidence of BP elevations (>160 mmHg systolic or >110 mmHg diastolic) or preeclampsia symptoms after 20 weeks' gestation.

## 2025-07-14 NOTE — ASSESSMENT & PLAN NOTE
-Continue care per primary OB  -Cell free DNA screening for the common aneuploidies was desired and drawn today

## 2025-07-14 NOTE — ASSESSMENT & PLAN NOTE
-Unable to view last operative report  -The patient reports that her primary OB has counseled her on delivery route including a trial of labor after , and she is opting for repeat  delivery

## 2025-07-15 LAB
EST. AVERAGE GLUCOSE BLD GHB EST-MCNC: 100 MG/DL
EST. AVERAGE GLUCOSE BLD GHB EST-SCNC: 5.5 MMOL/L
HBA1C MFR BLD: 5.1 %

## 2025-07-18 ENCOUNTER — APPOINTMENT (OUTPATIENT)
Dept: RADIOLOGY | Facility: HOSPITAL | Age: 30
End: 2025-07-18
Payer: COMMERCIAL

## 2025-07-18 ENCOUNTER — HOSPITAL ENCOUNTER (EMERGENCY)
Facility: HOSPITAL | Age: 30
Discharge: HOME | End: 2025-07-18
Attending: EMERGENCY MEDICINE
Payer: COMMERCIAL

## 2025-07-18 VITALS
TEMPERATURE: 98.6 F | WEIGHT: 224 LBS | SYSTOLIC BLOOD PRESSURE: 120 MMHG | OXYGEN SATURATION: 97 % | HEART RATE: 72 BPM | RESPIRATION RATE: 17 BRPM | BODY MASS INDEX: 37.32 KG/M2 | HEIGHT: 65 IN | DIASTOLIC BLOOD PRESSURE: 81 MMHG

## 2025-07-18 DIAGNOSIS — I82.432 ACUTE DEEP VEIN THROMBOSIS OF LEFT POPLITEAL VEIN (MULTI): Primary | ICD-10-CM

## 2025-07-18 LAB
ALBUMIN SERPL BCP-MCNC: 3.9 G/DL (ref 3.4–5)
ALP SERPL-CCNC: 37 U/L (ref 33–110)
ALT SERPL W P-5'-P-CCNC: 19 U/L (ref 7–45)
ANION GAP SERPL CALC-SCNC: 10 MMOL/L (ref 10–20)
APTT PPP: 25 SECONDS (ref 26–36)
AST SERPL W P-5'-P-CCNC: 18 U/L (ref 9–39)
BASOPHILS # BLD AUTO: 0.03 X10*3/UL (ref 0–0.1)
BASOPHILS NFR BLD AUTO: 0.3 %
BILIRUB SERPL-MCNC: 0.5 MG/DL (ref 0–1.2)
BUN SERPL-MCNC: 6 MG/DL (ref 6–23)
CALCIUM SERPL-MCNC: 8.6 MG/DL (ref 8.6–10.3)
CHLORIDE SERPL-SCNC: 108 MMOL/L (ref 98–107)
CO2 SERPL-SCNC: 21 MMOL/L (ref 21–32)
CREAT SERPL-MCNC: 0.46 MG/DL (ref 0.5–1.05)
EGFRCR SERPLBLD CKD-EPI 2021: >90 ML/MIN/1.73M*2
EOSINOPHIL # BLD AUTO: 0.23 X10*3/UL (ref 0–0.7)
EOSINOPHIL NFR BLD AUTO: 2.5 %
ERYTHROCYTE [DISTWIDTH] IN BLOOD BY AUTOMATED COUNT: 11.7 % (ref 11.5–14.5)
GLUCOSE SERPL-MCNC: 72 MG/DL (ref 74–99)
HCT VFR BLD AUTO: 37.8 % (ref 36–46)
HGB BLD-MCNC: 13.2 G/DL (ref 12–16)
IMM GRANULOCYTES # BLD AUTO: 0.05 X10*3/UL (ref 0–0.7)
IMM GRANULOCYTES NFR BLD AUTO: 0.5 % (ref 0–0.9)
INR PPP: 1 (ref 0.9–1.1)
LYMPHOCYTES # BLD AUTO: 2.02 X10*3/UL (ref 1.2–4.8)
LYMPHOCYTES NFR BLD AUTO: 21.6 %
MCH RBC QN AUTO: 30.8 PG (ref 26–34)
MCHC RBC AUTO-ENTMCNC: 34.9 G/DL (ref 32–36)
MCV RBC AUTO: 88 FL (ref 80–100)
MONOCYTES # BLD AUTO: 0.57 X10*3/UL (ref 0.1–1)
MONOCYTES NFR BLD AUTO: 6.1 %
NEUTROPHILS # BLD AUTO: 6.44 X10*3/UL (ref 1.2–7.7)
NEUTROPHILS NFR BLD AUTO: 69 %
NRBC BLD-RTO: 0 /100 WBCS (ref 0–0)
PLATELET # BLD AUTO: 143 X10*3/UL (ref 150–450)
POTASSIUM SERPL-SCNC: 3.9 MMOL/L (ref 3.5–5.3)
PROT SERPL-MCNC: 6.2 G/DL (ref 6.4–8.2)
PROTHROMBIN TIME: 10.5 SECONDS (ref 9.8–12.4)
RBC # BLD AUTO: 4.28 X10*6/UL (ref 4–5.2)
SODIUM SERPL-SCNC: 135 MMOL/L (ref 136–145)
WBC # BLD AUTO: 9.3 X10*3/UL (ref 4.4–11.3)

## 2025-07-18 PROCEDURE — 2500000004 HC RX 250 GENERAL PHARMACY W/ HCPCS (ALT 636 FOR OP/ED): Performed by: EMERGENCY MEDICINE

## 2025-07-18 PROCEDURE — 99284 EMERGENCY DEPT VISIT MOD MDM: CPT | Mod: 25 | Performed by: EMERGENCY MEDICINE

## 2025-07-18 PROCEDURE — 93971 EXTREMITY STUDY: CPT | Performed by: STUDENT IN AN ORGANIZED HEALTH CARE EDUCATION/TRAINING PROGRAM

## 2025-07-18 PROCEDURE — 96372 THER/PROPH/DIAG INJ SC/IM: CPT | Performed by: EMERGENCY MEDICINE

## 2025-07-18 PROCEDURE — 93971 EXTREMITY STUDY: CPT

## 2025-07-18 PROCEDURE — 85025 COMPLETE CBC W/AUTO DIFF WBC: CPT | Performed by: EMERGENCY MEDICINE

## 2025-07-18 PROCEDURE — 36415 COLL VENOUS BLD VENIPUNCTURE: CPT | Performed by: EMERGENCY MEDICINE

## 2025-07-18 PROCEDURE — 85730 THROMBOPLASTIN TIME PARTIAL: CPT | Performed by: EMERGENCY MEDICINE

## 2025-07-18 PROCEDURE — 80053 COMPREHEN METABOLIC PANEL: CPT | Performed by: EMERGENCY MEDICINE

## 2025-07-18 PROCEDURE — 85610 PROTHROMBIN TIME: CPT | Performed by: EMERGENCY MEDICINE

## 2025-07-18 RX ORDER — ENOXAPARIN SODIUM 100 MG/ML
1 INJECTION SUBCUTANEOUS ONCE
Status: COMPLETED | OUTPATIENT
Start: 2025-07-18 | End: 2025-07-18

## 2025-07-18 RX ORDER — ASPIRIN 81 MG/1
81 TABLET ORAL ONCE
COMMUNITY
Start: 2025-07-14 | End: 2025-07-30 | Stop reason: SDUPTHER

## 2025-07-18 RX ORDER — ENOXAPARIN SODIUM 100 MG/ML
100 INJECTION SUBCUTANEOUS EVERY 12 HOURS
Qty: 60 EACH | Refills: 0 | Status: SHIPPED | OUTPATIENT
Start: 2025-07-18 | End: 2025-07-25 | Stop reason: SDUPTHER

## 2025-07-18 RX ADMIN — ENOXAPARIN SODIUM 100 MG: 100 INJECTION SUBCUTANEOUS at 20:23

## 2025-07-18 ASSESSMENT — PAIN - FUNCTIONAL ASSESSMENT: PAIN_FUNCTIONAL_ASSESSMENT: 0-10

## 2025-07-18 ASSESSMENT — PAIN SCALES - GENERAL: PAINLEVEL_OUTOF10: 0 - NO PAIN

## 2025-07-18 NOTE — ED PROVIDER NOTES
HPI   Chief Complaint   Patient presents with    Leg Pain     Pt went to her OB today and OB wants her to be seen for possible blood clot in her left leg. Pt has swelling and pain to her left leg.          History provided by:  Patient    Chief Complaint   Patient presents with    Leg Pain     Pt went to her OB today and OB wants her to be seen for possible blood clot in her left leg. Pt has swelling and pain to her left leg.        History of Present Illness:  Marlee Gamboa is a 30 y.o. female presents with left leg swelling.  Patient is approximately 13 weeks pregnant.  She saw her OB today.  Due to the swelling in her leg, her OB wanted her to be evaluated for a DVT.  Her OB is otherwise evaluating her for other causes of swelling in the lower extremity.  No fever or chills.  No nausea or vomiting.  No rash.  No chest pain or shortness of breath.  No abdominal pain.  No treatment prior to arrival.  Nothing seems to make her symptoms worse or better.      PMFSH:   As per HPI, otherwise nurses notes reviewed in EMR.    Past Medical History: Medical History[1]   Past Surgical History: Surgical History[2]   Family History: Family History[3]   Social History:  Social History[4]  Allergies: Allergies[5]  Current Outpatient Medications   Medication Instructions    aspirin 81 mg, Once    enoxaparin (LOVENOX) 100 mg, subcutaneous, Every 12 hours    NIFEdipine CC 30 mg 24 hr tablet 1 tablet, Daily (0630)              Patient History   Medical History[6]  Surgical History[7]  Family History[8]  Social History[9]    Physical Exam   ED Triage Vitals [07/18/25 1700]   Temperature Heart Rate Respirations BP   37 °C (98.6 °F) 74 16 125/72      Pulse Ox Temp Source Heart Rate Source Patient Position   97 % Temporal Monitor --      BP Location FiO2 (%)     -- --       Physical Exam  Physical Exam:    ED Triage Vitals [07/18/25 1700]   Temperature Heart Rate Respirations BP   37 °C (98.6 °F) 74 16 125/72      Pulse Ox Temp Source  "Heart Rate Source Patient Position   97 % Temporal Monitor --      BP Location FiO2 (%)     -- --         Patient Vitals for the past 24 hrs:   BP Temp Temp src Pulse Resp SpO2 Height Weight   07/18/25 1700 125/72 37 °C (98.6 °F) Temporal 74 16 97 % 1.651 m (5' 5\") 102 kg (224 lb)       Constitutional: Vital signs per nursing notes.  Well developed, well nourished.  No acute distress.    Psychiatric: alert and oriented to person, place, and time; no abnormalities of mood or affect; memory intact  Eyes: PERRL; conjunctivae and lids normal; EOMI  Respiratory: normal respiratory effort and excursion; no rales, rhonchi, or wheezes; equal air entry  Cardiovascular: regular rate and rhythm; no murmurs, rubs or gallops; symmetric pulses; 1+ RLE edema and 2+ LLE edema; normal capillary refill; distal pulses present  Neurological: normal speech; CN II-XII grossly intact; normal motor and sensory function; no nystagmus  GI: no masses, tenderness, rebound or guarding; no palpable, pulsatile mass; no organomegaly; no hernia; normal bowel sounds; (-) De León´s sign; (-) McBurney´s sign; (-) CVA tenderness  Lymphatic: no adenopathy of groin  Musculoskeletal: normal gait and station; normal digits and nails; no gross tendon or ligament injury; normal to palpation; normal strength/tone; neurovascular status intact; (-) Josef´s sign; (-) straight leg raise; no palpable cords; calf circumference equal bilaterally  Skin: normal to inspection; normal to palpation; no rash  GCS: 15      ED Course & MDM   Diagnoses as of 07/18/25 1951   Acute deep vein thrombosis of left popliteal vein (Multi)                 No data recorded     Richie Coma Scale Score: 15 (07/18/25 1701 : Lilo Teran RN)                           Medical Decision Making  Medical Decision Making:    EKG:    Labs:   Labs Reviewed   CBC WITH AUTO DIFFERENTIAL   COMPREHENSIVE METABOLIC PANEL   PROTIME-INR   APTT       Diagnostic Imaging:   Lower extremity venous duplex " left   Final Result   1. A nonocclusive thrombus is present in the partially compressible   left popliteal vein.   2. No evidence of acute DVT in all the other veins of the left lower   extremity.             MACRO:   None.        Signed by: Boy Barrera 7/18/2025 6:23 PM   Dictation workstation:   [id number]          ED Medication Administration:   Medications   enoxaparin (Lovenox) syringe 100 mg (has no administration in time range)       ED Course:     Marlee Gamboa is a 30 y.o. female presents with   left lower leg swelling.    Differential Diagnoses Considered:  Peripheral edema, DVT    Patient presents with peripheral edema.     Considered bloodwork (CBC, CMP), but not indicated as I considered but do not suspect severe anemia, acute renal failure, acute liver failure/cirrhosis, electrolyte abnormality (including hypokalemia, hyperkalemia, hyponatremia, hypernatremia, hypoglycemia, hyperglycemia).    Considered EKG/troponin, but not indicated as I considered but do not suspect ACS/AMI.    Considered CXR/BNP, but not indicated as I considered but do not suspect CHF.    Duplex ultrasound to evaluate for evidence of DVT.             Diagnoses as of 07/18/25 1951   Acute deep vein thrombosis of left popliteal vein (Multi)       Abnormal Labs Reviewed - No abnormal labs to display    BP      Temp      Pulse     Resp      SpO2          I reviewed the patient's most recent maternal-fetal medicine note available in the electronic medical record from July 14, 2025 which showed the following:    Assessment & Plan  Chronic hypertension affecting pregnancy ([facility])  The patient has a known history of chronic hypertension. She was diagnosed in 2024. She has no known history of end-organ manifestations and is currently well-controlled on nifedipine XR 30mg daily. Her serum Cr was 0.71 last month.     Pregnancy outcomes in women with chronic hypertension depend largely upon the degree of blood pressure control  and prior end organ damage.  Risks associated with hypertension in pregnancy include superimposed preeclampsia, abruption, growth restriction, and  delivery, among others.  Continued efforts to optimize her hypertension control will remain important life-long.      In the setting of CHTN in pregnancy, we recommend:  - Daily low-dose aspirin (162mg daily), to start now  - Baseline CBC, CMP and urine protein assessment before 20 weeks  - Detailed anatomy scan  - Serial assessments of fetal growth at 28, 32, and 36 weeks  - Once-weekly  testing starting at 32 weeks if on antihypertensive medications.  - Delivery at 37-39 weeks if on medications and 38-39 weeks if not on medications.   - Evaluations for preeclampsia with evidence of BP elevations (>160 mmHg systolic or >110 mmHg diastolic) or preeclampsia symptoms after 20 weeks' gestation.      History of gestational diabetes in prior pregnancy, currently pregnant ([facility])  History of macrosomia in infant in prior pregnancy, currently pregnant ([facility])  -History of diet-controlled GDM complicated by fetal macrosomia with birth weight of 4.68 kg  -We reviewed the increased risk for recurrence and life-long diabetes risk   -Hemoglobin A1c ordered today for early screening  -Recommend 1 hour GTT at 24-28 weeks  -Please refer back to M if the patient does develop gestational diabetes     History of  delivery affecting pregnancy ([facility])  -Unable to view last operative report  -The patient reports that her primary OB has counseled her on delivery route including a trial of labor after , and she is opting for repeat  delivery     Obesity in pregnancy ([facility])  -Recommend a total weight gain of no more than 11-20lb during this pregnancy  -Fetal growth will be followed per cHTN guidelines     12 weeks gestation of pregnancy ([facility])  -Continue care per primary OB  -Cell free DNA screening for the common aneuploidies was desired  and drawn today      Nava Blackwell MD   Maternal Fetal Medicine        Diagnostic evaluation was completed.  Ultrasound of the left lower extremity shows a nonocclusive thrombus present in the partially compressible left popliteal vein.  No evidence of acute DVT in all the other veins of the left lower extremity.    Re-evaluation: Repeat evaluation at 1948 shows the patient is feeling well.  No chest pain or shortness of breath.  Vital signs are stable.    Patient was treated with a dose of subcutaneous Lovenox.    Medications administered improved the patient's condition.    I have reviewed the patient's history, physical exam, and test information with the patient's physician,  Dr. Dunham ([facility] at )                 , who agrees to discharge the patient, and he/she will have short term follow up with the patient in the office.  He recommends sending off basic blood work including CBC and metabolic panel.  He also recommends starting the patient on Lovenox 100 mg twice daily.    Patient encouraged to use over-the-counter compression stockings and to elevate legs.    I discussed the results and discharge plan with the patient and/or family/friend if present.  I emphasized the importance of follow up with the physician I referred them to in the timeframe recommended.  I explained reasons for the patient to return to the Emergency Department.  Questions were addressed.  The patient and/or family/friend expressed understanding.      Patient was instructed to have follow up with primary doctor or specialist within 1-3 days.      Shared decision making made with patient, and/or family, who agrees with plan.      Escalation of care considered:     I considered hospitalization.  However, given the improvement in symptoms and reassuring workup, patient is appropriate for discharge and outpatient care. The risk of hospitalization outweighs the benefits. The patient is resting comfortably, well hydrated, tolerating PO,  normal neuro exam, lungs CTA, ab soft NTND, not requiring supplemental O2/supportive care/IV medications or IVF.  Ambulating well.    I do not suspect life threatening, emergent or urgent condition, currently.  Shared decision made with patient and/or family who agrees with plan.    Prescription Medication Consideration/Given:   ED Prescriptions       Medication Sig Dispense Start Date End Date Auth. Provider    enoxaparin (Lovenox) 100 mg/mL syringe Inject 1 mL (100 mg) under the skin every 12 hours. 60 each 2025 -- Jaylon HAMM MD            Diagnosis:   1. Acute deep vein thrombosis of left popliteal vein (Multi)                         Procedure  Procedures         [1]   Past Medical History:  Diagnosis Date    Anxiety     Asthma     Bipolar 1 disorder (Multi)     Gestational diabetes     Hypertension     Unspecified dislocation of unspecified patella, initial encounter 2021    Dislocation of patella   [2]   Past Surgical History:  Procedure Laterality Date     SECTION, LOW TRANSVERSE      OTHER SURGICAL HISTORY  12/15/2020    Nasal polypectomy    TONSILLECTOMY      WISDOM TOOTH EXTRACTION     [3]   Family History  Problem Relation Name Age of Onset    Hypertension Mother      Hypertension Father      Hypertension Paternal Grandmother      Hyperlipidemia Paternal Grandmother     [4]   Social History  Tobacco Use    Smoking status: Former     Types: Cigarettes     Passive exposure: Never    Smokeless tobacco: Never   Vaping Use    Vaping status: Never Used   Substance Use Topics    Alcohol use: Not Currently    Drug use: Not Currently     Types: Marijuana   [5]   Allergies  Allergen Reactions    Lisinopril Headache and Nausea/vomiting    Bee Pollen Other    Latex Other    Latex, Natural Rubber Itching    Mushroom Unknown    Morphine Itching   [6]   Past Medical History:  Diagnosis Date    Anxiety     Asthma     Bipolar 1 disorder (Multi)     Gestational diabetes     Hypertension      Unspecified dislocation of unspecified patella, initial encounter 2021    Dislocation of patella   [7]   Past Surgical History:  Procedure Laterality Date     SECTION, LOW TRANSVERSE      OTHER SURGICAL HISTORY  12/15/2020    Nasal polypectomy    TONSILLECTOMY      WISDOM TOOTH EXTRACTION     [8]   Family History  Problem Relation Name Age of Onset    Hypertension Mother      Hypertension Father      Hypertension Paternal Grandmother      Hyperlipidemia Paternal Grandmother     [9]   Social History  Tobacco Use    Smoking status: Former     Types: Cigarettes     Passive exposure: Never    Smokeless tobacco: Never   Vaping Use    Vaping status: Never Used   Substance Use Topics    Alcohol use: Not Currently    Drug use: Not Currently     Types: Marijuana        Jaylon HAMM MD  25

## 2025-07-18 NOTE — Clinical Note
Marlee Gamboa was seen and treated in our emergency department on 7/18/2025.  She may return to work on 07/21/2025.       If you have any questions or concerns, please don't hesitate to call.      Jaylon HAMM MD

## 2025-07-18 NOTE — Clinical Note
Marlee Gamboa was seen and treated in our emergency department on 7/18/2025.  She may return to work on 07/20/2025.       If you have any questions or concerns, please don't hesitate to call.      Jaylon HAMM MD

## 2025-07-22 NOTE — PROGRESS NOTES
Subjective   Patient ID [id number]   Marlee Gamboa is a 30 y.o.  at 14w2d seen in follow up consultation after diagnosis of LE DVT.  She had no obstetric complaints at the time of her visit today. We had previously seen her in consultation for CHTN. She presented to the [place] ER  with new onset left leg pain and swelling, Doppler demonstrated thrombosis of the popliteal vein, nonocclusive though symptomatic. She was thus initiated on therapeutic Lovenox. She reports a family history of thrombosis on her paternal side, though details are unclear and she is not in communication with her father. She has been and continues to take aspirin prophylaxis. She has initiated the Lovenox injections which she reports are going well, albeit with anticipated bruising. Her left leg symptoms have been improving.       Objective   Visit Vitals  /79      Physical Exam  Weight: 102 kg (225 lb 14.4 oz)  BP: 120/79  Fetal Heart Rate: 134      Prenatal Labs  Urine dip:  Lab Results   Component Value Date    KETONESU NEGATIVE 2025       Lab Results   Component Value Date    HGB 13.2 2025    HCT 37.8 2025    ABO O 2025       Imaging  Prior NT, she is scheduled for anatomic evaluation     Assessment/Plan     DVT in pregnancy  I reviewed the later utility of an inherited thrombophilia workup given her potential paternal history.   I reviewed the use of Lovenox in pregnancy including the need to later coordinate anticoagulation around delivery. I reviewed the risks of labor proximal to anticoagulation including potential limitations on regional anesthesia. I reviewed the continuation of anticoagulation for 6 weeks post partum, as this is the highest risk period for thrombosis.  Of note, she has had a prior CD and plans a repeat.     She had a mild thrombocytopenia (143) prior to the initiation of Lovenox. Her plts had been previously normal. Thus her CBC was repeated today an was stable (platelets  147).      CHTN  Her BP is within normal limits today and with home monitoring  Prior baseline labs, using aspirin prophylaxis.     Prenatal care  Given the combination of CHTN and now DVT on anticoagulation she desired transfer to [facility] care, which is reasonable. I reviewed our office and practice model, follow up prenatal care visit was scheduled.     Kel Dunham MD   Maternal Fetal Medicine

## 2025-07-25 ENCOUNTER — ROUTINE PRENATAL (OUTPATIENT)
Dept: MATERNAL FETAL MEDICINE | Facility: CLINIC | Age: 30
End: 2025-07-25
Payer: COMMERCIAL

## 2025-07-25 ENCOUNTER — TELEPHONE (OUTPATIENT)
Dept: RADIOLOGY | Facility: CLINIC | Age: 30
End: 2025-07-25
Payer: COMMERCIAL

## 2025-07-25 VITALS — DIASTOLIC BLOOD PRESSURE: 79 MMHG | SYSTOLIC BLOOD PRESSURE: 120 MMHG | BODY MASS INDEX: 37.59 KG/M2 | WEIGHT: 225.9 LBS

## 2025-07-25 DIAGNOSIS — O22.32 DEEP VEIN THROMBOSIS (DVT) AFFECTING PREGNANCY IN SECOND TRIMESTER (HHS-HCC): Primary | ICD-10-CM

## 2025-07-25 DIAGNOSIS — O21.9 NAUSEA/VOMITING IN PREGNANCY: ICD-10-CM

## 2025-07-25 DIAGNOSIS — O10.919 CHRONIC HYPERTENSION AFFECTING PREGNANCY (HHS-HCC): ICD-10-CM

## 2025-07-25 DIAGNOSIS — I82.432 ACUTE DEEP VEIN THROMBOSIS OF LEFT POPLITEAL VEIN (MULTI): ICD-10-CM

## 2025-07-25 PROCEDURE — 99215 OFFICE O/P EST HI 40 MIN: CPT | Performed by: OBSTETRICS & GYNECOLOGY

## 2025-07-25 PROCEDURE — 0501F PRENATAL FLOW SHEET: CPT | Performed by: OBSTETRICS & GYNECOLOGY

## 2025-07-25 PROCEDURE — 36415 COLL VENOUS BLD VENIPUNCTURE: CPT | Performed by: OBSTETRICS & GYNECOLOGY

## 2025-07-25 PROCEDURE — 85027 COMPLETE CBC AUTOMATED: CPT | Performed by: OBSTETRICS & GYNECOLOGY

## 2025-07-25 RX ORDER — ONDANSETRON 4 MG/1
TABLET, ORALLY DISINTEGRATING ORAL
COMMUNITY
Start: 2025-07-22 | End: 2025-07-25 | Stop reason: SDUPTHER

## 2025-07-25 RX ORDER — NIFEDIPINE 30 MG/1
30 TABLET, EXTENDED RELEASE ORAL
Qty: 30 TABLET | Refills: 4 | Status: SHIPPED | OUTPATIENT
Start: 2025-07-25

## 2025-07-25 RX ORDER — ENOXAPARIN SODIUM 100 MG/ML
100 INJECTION SUBCUTANEOUS EVERY 12 HOURS
Qty: 60 EACH | Refills: 4 | Status: SHIPPED | OUTPATIENT
Start: 2025-07-25

## 2025-07-25 RX ORDER — ONDANSETRON 4 MG/1
4 TABLET, ORALLY DISINTEGRATING ORAL EVERY 12 HOURS PRN
Qty: 20 TABLET | Refills: 2 | Status: SHIPPED | OUTPATIENT
Start: 2025-07-25

## 2025-07-25 NOTE — TELEPHONE ENCOUNTER
Want to know if her medications (Zofran & 81 mg Aspirins) switch over to the pharmacy [facility] [address]

## 2025-07-26 LAB
ERYTHROCYTE [DISTWIDTH] IN BLOOD BY AUTOMATED COUNT: 11.9 % (ref 11.5–14.5)
HCT VFR BLD AUTO: 39.1 % (ref 36–46)
HGB BLD-MCNC: 13.3 G/DL (ref 12–16)
MCH RBC QN AUTO: 30.6 PG (ref 26–34)
MCHC RBC AUTO-ENTMCNC: 34 G/DL (ref 32–36)
MCV RBC AUTO: 90 FL (ref 80–100)
NRBC BLD-RTO: 0 /100 WBCS (ref 0–0)
PLATELET # BLD AUTO: 147 X10*3/UL (ref 150–450)
RBC # BLD AUTO: 4.34 X10*6/UL (ref 4–5.2)
REFLEX ADDED, ANEMIA PANEL: NORMAL
WBC # BLD AUTO: 9.5 X10*3/UL (ref 4.4–11.3)

## 2025-07-28 ENCOUNTER — APPOINTMENT (OUTPATIENT)
Dept: PRIMARY CARE | Facility: CLINIC | Age: 30
End: 2025-07-28
Payer: COMMERCIAL

## 2025-07-28 ENCOUNTER — TELEPHONE (OUTPATIENT)
Dept: RADIOLOGY | Facility: CLINIC | Age: 30
End: 2025-07-28

## 2025-07-28 NOTE — TELEPHONE ENCOUNTER
PT calling due to her work requesting a letter listing her restrictions such as how much she can lift. She said sending it to MyChart works. Thanks!

## 2025-07-30 DIAGNOSIS — O10.919 CHRONIC HYPERTENSION AFFECTING PREGNANCY (HHS-HCC): ICD-10-CM

## 2025-07-30 RX ORDER — ASPIRIN 81 MG/1
162 TABLET ORAL DAILY
Qty: 60 TABLET | Refills: 3 | Status: SHIPPED | OUTPATIENT
Start: 2025-07-30

## 2025-07-30 NOTE — TELEPHONE ENCOUNTER
PT calling in because she needs a new script sent in for her baby aspirin as she was upped to two a day instead of one a day. She ran out but the pharmacy will not refill early.

## 2025-08-04 LAB — COMMENTS - MP RESULT TYPE: NORMAL

## 2025-08-06 ENCOUNTER — HOSPITAL ENCOUNTER (EMERGENCY)
Facility: HOSPITAL | Age: 30
Discharge: HOME | End: 2025-08-06
Payer: COMMERCIAL

## 2025-08-06 ENCOUNTER — APPOINTMENT (OUTPATIENT)
Dept: CARDIOLOGY | Facility: HOSPITAL | Age: 30
End: 2025-08-06
Payer: COMMERCIAL

## 2025-08-06 VITALS
WEIGHT: 223 LBS | TEMPERATURE: 97 F | HEIGHT: 65 IN | OXYGEN SATURATION: 98 % | BODY MASS INDEX: 37.15 KG/M2 | HEART RATE: 81 BPM | SYSTOLIC BLOOD PRESSURE: 132 MMHG | RESPIRATION RATE: 19 BRPM | DIASTOLIC BLOOD PRESSURE: 74 MMHG

## 2025-08-06 DIAGNOSIS — R82.71 ASYMPTOMATIC BACTERIURIA DURING PREGNANCY (HHS-HCC): Primary | ICD-10-CM

## 2025-08-06 DIAGNOSIS — O99.891 ASYMPTOMATIC BACTERIURIA DURING PREGNANCY (HHS-HCC): Primary | ICD-10-CM

## 2025-08-06 LAB
ALBUMIN SERPL BCP-MCNC: 3.8 G/DL (ref 3.4–5)
ALP SERPL-CCNC: 39 U/L (ref 33–110)
ALT SERPL W P-5'-P-CCNC: 16 U/L (ref 7–45)
ANION GAP SERPL CALC-SCNC: 10 MMOL/L (ref 10–20)
APPEARANCE UR: CLEAR
AST SERPL W P-5'-P-CCNC: 13 U/L (ref 9–39)
BACTERIA #/AREA URNS AUTO: ABNORMAL /HPF
BASOPHILS # BLD AUTO: 0.03 X10*3/UL (ref 0–0.1)
BASOPHILS NFR BLD AUTO: 0.3 %
BILIRUB SERPL-MCNC: 0.6 MG/DL (ref 0–1.2)
BILIRUB UR STRIP.AUTO-MCNC: NEGATIVE MG/DL
BUN SERPL-MCNC: 7 MG/DL (ref 6–23)
CALCIUM SERPL-MCNC: 8.9 MG/DL (ref 8.6–10.3)
CHLORIDE SERPL-SCNC: 108 MMOL/L (ref 98–107)
CO2 SERPL-SCNC: 23 MMOL/L (ref 21–32)
COLOR UR: YELLOW
CREAT SERPL-MCNC: 0.51 MG/DL (ref 0.5–1.05)
EGFRCR SERPLBLD CKD-EPI 2021: >90 ML/MIN/1.73M*2
EOSINOPHIL # BLD AUTO: 0.2 X10*3/UL (ref 0–0.7)
EOSINOPHIL NFR BLD AUTO: 2.1 %
ERYTHROCYTE [DISTWIDTH] IN BLOOD BY AUTOMATED COUNT: 12.3 % (ref 11.5–14.5)
GLUCOSE SERPL-MCNC: 110 MG/DL (ref 74–99)
GLUCOSE UR STRIP.AUTO-MCNC: ABNORMAL MG/DL
HCT VFR BLD AUTO: 36.6 % (ref 36–46)
HGB BLD-MCNC: 13 G/DL (ref 12–16)
IMM GRANULOCYTES # BLD AUTO: 0.07 X10*3/UL (ref 0–0.7)
IMM GRANULOCYTES NFR BLD AUTO: 0.7 % (ref 0–0.9)
INR PPP: 1 (ref 0.9–1.1)
KETONES UR STRIP.AUTO-MCNC: NEGATIVE MG/DL
LEUKOCYTE ESTERASE UR QL STRIP.AUTO: NEGATIVE
LYMPHOCYTES # BLD AUTO: 1.53 X10*3/UL (ref 1.2–4.8)
LYMPHOCYTES NFR BLD AUTO: 16.4 %
MAGNESIUM SERPL-MCNC: 1.66 MG/DL (ref 1.6–2.4)
MCH RBC QN AUTO: 31 PG (ref 26–34)
MCHC RBC AUTO-ENTMCNC: 35.5 G/DL (ref 32–36)
MCV RBC AUTO: 87 FL (ref 80–100)
MONOCYTES # BLD AUTO: 0.55 X10*3/UL (ref 0.1–1)
MONOCYTES NFR BLD AUTO: 5.9 %
MUCOUS THREADS #/AREA URNS AUTO: ABNORMAL /LPF
NEUTROPHILS # BLD AUTO: 6.97 X10*3/UL (ref 1.2–7.7)
NEUTROPHILS NFR BLD AUTO: 74.6 %
NITRITE UR QL STRIP.AUTO: NEGATIVE
NRBC BLD-RTO: 0 /100 WBCS (ref 0–0)
PH UR STRIP.AUTO: 6.5 [PH]
PLATELET # BLD AUTO: 161 X10*3/UL (ref 150–450)
POTASSIUM SERPL-SCNC: 3.9 MMOL/L (ref 3.5–5.3)
PROT SERPL-MCNC: 6.2 G/DL (ref 6.4–8.2)
PROT UR STRIP.AUTO-MCNC: ABNORMAL MG/DL
PROTHROMBIN TIME: 11 SECONDS (ref 9.8–12.4)
RBC # BLD AUTO: 4.19 X10*6/UL (ref 4–5.2)
RBC # UR STRIP.AUTO: NEGATIVE MG/DL
RBC #/AREA URNS AUTO: ABNORMAL /HPF
SODIUM SERPL-SCNC: 137 MMOL/L (ref 136–145)
SP GR UR STRIP.AUTO: 1.03
SQUAMOUS #/AREA URNS AUTO: ABNORMAL /HPF
UROBILINOGEN UR STRIP.AUTO-MCNC: ABNORMAL MG/DL
WBC # BLD AUTO: 9.4 X10*3/UL (ref 4.4–11.3)
WBC #/AREA URNS AUTO: ABNORMAL /HPF

## 2025-08-06 PROCEDURE — 99284 EMERGENCY DEPT VISIT MOD MDM: CPT

## 2025-08-06 PROCEDURE — 81001 URINALYSIS AUTO W/SCOPE: CPT | Performed by: PHYSICIAN ASSISTANT

## 2025-08-06 PROCEDURE — 85610 PROTHROMBIN TIME: CPT | Performed by: PHYSICIAN ASSISTANT

## 2025-08-06 PROCEDURE — 36415 COLL VENOUS BLD VENIPUNCTURE: CPT | Performed by: PHYSICIAN ASSISTANT

## 2025-08-06 PROCEDURE — 80053 COMPREHEN METABOLIC PANEL: CPT | Performed by: PHYSICIAN ASSISTANT

## 2025-08-06 PROCEDURE — 83735 ASSAY OF MAGNESIUM: CPT | Performed by: PHYSICIAN ASSISTANT

## 2025-08-06 PROCEDURE — 85025 COMPLETE CBC W/AUTO DIFF WBC: CPT | Performed by: PHYSICIAN ASSISTANT

## 2025-08-06 PROCEDURE — 2500000001 HC RX 250 WO HCPCS SELF ADMINISTERED DRUGS (ALT 637 FOR MEDICARE OP): Performed by: PHYSICIAN ASSISTANT

## 2025-08-06 PROCEDURE — 93005 ELECTROCARDIOGRAM TRACING: CPT

## 2025-08-06 RX ORDER — CEPHALEXIN 500 MG/1
500 CAPSULE ORAL ONCE
Status: COMPLETED | OUTPATIENT
Start: 2025-08-06 | End: 2025-08-06

## 2025-08-06 RX ORDER — CEPHALEXIN 500 MG/1
500 CAPSULE ORAL 4 TIMES DAILY
Qty: 20 CAPSULE | Refills: 0 | Status: SHIPPED | OUTPATIENT
Start: 2025-08-06 | End: 2025-08-11

## 2025-08-06 RX ADMIN — CEPHALEXIN 500 MG: 500 CAPSULE ORAL at 19:18

## 2025-08-06 ASSESSMENT — LIFESTYLE VARIABLES
HAVE YOU EVER FELT YOU SHOULD CUT DOWN ON YOUR DRINKING: NO
HAVE PEOPLE ANNOYED YOU BY CRITICIZING YOUR DRINKING: NO
EVER FELT BAD OR GUILTY ABOUT YOUR DRINKING: NO
EVER HAD A DRINK FIRST THING IN THE MORNING TO STEADY YOUR NERVES TO GET RID OF A HANGOVER: NO
TOTAL SCORE: 0

## 2025-08-06 ASSESSMENT — PAIN SCALES - GENERAL
PAINLEVEL_OUTOF10: 0 - NO PAIN
PAINLEVEL_OUTOF10: 1

## 2025-08-06 ASSESSMENT — PAIN - FUNCTIONAL ASSESSMENT: PAIN_FUNCTIONAL_ASSESSMENT: 0-10

## 2025-08-06 NOTE — ED PROVIDER NOTES
"HPI   Chief Complaint   Patient presents with    Dizziness     17 weeks pregnant      Vomiting Blood     Per pt she was vomiting black mucus for 5 minutes today, takes lovenox         Patient presents with a conglomerate of complaints.  States that they all started today.  States she is having palpitations and feeling slightly winded and fatigued.  States that she typically vomits after brushing her teeth every morning due to the pregnancy and today when she vomited she thought the mucus appeared somewhat black to her.  Denies any coffee-ground emesis.  No black or tarry stools.  No abdominal pain.  No vaginal bleeding or discharge.  No dysuria frequency or urgency.  States that she is finishing up an antibiotic for UTI.  No back pain.  No fall or injury.  Patient was concerned with the \"black David,\" due to the fact that she is currently on Lovenox for DVT.  Patient's pregnancy is also complicated by hypertension.      History provided by:  Patient          Patient History   Medical History[1]  Surgical History[2]  Family History[3]  Social History[4]    Physical Exam   ED Triage Vitals [08/06/25 1747]   Temperature Heart Rate Respirations BP   36.2 °C (97.2 °F) 87 18 136/81      Pulse Ox Temp src Heart Rate Source Patient Position   97 % -- -- --      BP Location FiO2 (%)     -- --       Physical Exam  Vitals and nursing note reviewed.   Constitutional:       General: She is not in acute distress.     Appearance: Normal appearance. She is well-developed, well-groomed and overweight. She is not ill-appearing or toxic-appearing.   HENT:      Head: Normocephalic.      Right Ear: External ear normal.      Left Ear: External ear normal.      Nose: Nose normal.      Mouth/Throat:      Lips: Pink. No lesions.      Mouth: Mucous membranes are moist.      Dentition: No gum lesions.      Tongue: No lesions.      Palate: No lesions.      Pharynx: Oropharynx is clear. No oropharyngeal exudate.     Eyes:      General: No " scleral icterus.     Conjunctiva/sclera: Conjunctivae normal.       Cardiovascular:      Rate and Rhythm: Normal rate and regular rhythm.      Pulses:           Dorsalis pedis pulses are 2+ on the right side and 2+ on the left side.        Posterior tibial pulses are 2+ on the right side and 2+ on the left side.      Heart sounds: Normal heart sounds.   Pulmonary:      Effort: Pulmonary effort is normal.      Breath sounds: Normal breath sounds and air entry.   Abdominal:      General: Bowel sounds are normal. There is no distension.      Palpations: Abdomen is soft.      Tenderness: There is no abdominal tenderness. There is no right CVA tenderness, left CVA tenderness or guarding.     Musculoskeletal:      Right lower leg: No edema.      Left lower leg: No edema.     Skin:     General: Skin is warm.      Capillary Refill: Capillary refill takes less than 2 seconds.     Neurological:      General: No focal deficit present.      Mental Status: She is alert and oriented to person, place, and time.      Cranial Nerves: No cranial nerve deficit or facial asymmetry.      Sensory: No sensory deficit.      Motor: No weakness.      Gait: Gait normal.     Psychiatric:         Attention and Perception: Attention and perception normal.         Mood and Affect: Mood and affect normal.         Speech: Speech normal.         Behavior: Behavior normal. Behavior is cooperative.         Thought Content: Thought content normal.         Cognition and Memory: Cognition and memory normal.         Judgment: Judgment normal.           ED Course & MDM   ED Course as of 08/06/25 1924   Wed Aug 06, 2025   [address] [facility] 136 [LH]      ED Course User Index  [LH] Antonina Isabel PA-C         Diagnoses as of 08/06/25 1924   Asymptomatic bacteriuria during pregnancy ([facility])                 No data recorded     Richie Coma Scale Score: 15 (08/06/25 1748 : Mariah Jackson RN)                           Medical Decision Making  Patient presents with a  "conglomerate of complaints.  States that they all started today.  States she is having palpitations and feeling slightly winded and fatigued.  States that she typically vomits after brushing her teeth every morning due to the pregnancy and today when she vomited she thought the mucus appeared somewhat black to her.  Denies any coffee-ground emesis.  No black or tarry stools.  No abdominal pain.  No vaginal bleeding or discharge.  No dysuria frequency or urgency.  States that she is finishing up an antibiotic for UTI.  No back pain.  No fall or injury.  Patient was concerned with the \"black David,\" due to the fact that she is currently on Lovenox for DVT.  Patient's pregnancy is also complicated by hypertension.    Ddx: GI bleed, vomiting, anemia, complication pregnancy, other    Will obtain labs  Fetal heart tones obtained by nursing staff without concern.  Patient's urine was concerning for asymptomatic bacteriuria in pregnancy.  This was addressed with p.o. Keflex.  Patient's urine also showed glucose which is concerning for potential gestational diabetes.  I did discuss this with the patient and recommended that she follow-up with her OB and discuss whether she should have her glucose tolerance test earlier in this pregnancy.  Patient voiced understanding and agreement plan.  Magnesium is normal.  Chemistries are normal with a normal BUN making it unlikely the patient is digesting any blood.  Patient's INR and pro time are normal.  CBC is also normal with a hemoglobin of 13 making it unlikely the patient is having any hematemesis.  EKG also showed no concerning pattern.  Patient has been on anticoagulants for over a month now.  Making it unlikely the patient is suffering from any pulmonary embolus at this time.  Or other hypercoagulable complication.  Therefore patient can safely be discharged home to follow-up with her OB as soon as possible to discuss care.  Will keep her on Keflex for the next few days.  She " is encouraged to follow-up with soon as possible to discuss care.  Return with any worsening symptoms or concerns.  Patient discharged home in improved stable condition    Problems Addressed:  Asymptomatic bacteriuria during pregnancy ([facility]): undiagnosed new problem with uncertain prognosis    Amount and/or Complexity of Data Reviewed  Labs: ordered. Decision-making details documented in ED Course.  ECG/medicine tests: ordered and independent interpretation performed. Decision-making details documented in ED Course.     Details: Read by myself and attending showing normal sinus rhythm at a ventricular rate of 74 bpm.  Normal axis.  No ST segment elevation.  IN interval 164 with a QT of 412    Risk  OTC drugs.  Diagnosis or treatment significantly limited by social determinants of health.        Procedure  Procedures       [1]   Past Medical History:  Diagnosis Date    Anxiety     Asthma     Bipolar 1 disorder (Multi)     Gestational diabetes     Hypertension     Unspecified dislocation of unspecified patella, initial encounter 2021    Dislocation of patella   [2]   Past Surgical History:  Procedure Laterality Date     SECTION, LOW TRANSVERSE      OTHER SURGICAL HISTORY  12/15/2020    Nasal polypectomy    TONSILLECTOMY      WISDOM TOOTH EXTRACTION     [3]   Family History  Problem Relation Name Age of Onset    Hypertension Mother      Hypertension Father      Hypertension Paternal Grandmother      Hyperlipidemia Paternal Grandmother     [4]   Social History  Tobacco Use    Smoking status: Former     Types: Cigarettes     Passive exposure: Never    Smokeless tobacco: Never   Vaping Use    Vaping status: Never Used   Substance Use Topics    Alcohol use: Not Currently    Drug use: Not Currently     Types: Marijuana        Antonina Isabel PA-C  25 1921

## 2025-08-08 LAB
ATRIAL RATE: 74 BPM
P AXIS: 27 DEGREES
P OFFSET: 186 MS
P ONSET: 136 MS
PR INTERVAL: 164 MS
Q ONSET: 218 MS
QRS COUNT: 12 BEATS
QRS DURATION: 92 MS
QT INTERVAL: 412 MS
QTC CALCULATION(BAZETT): 457 MS
QTC FREDERICIA: 442 MS
R AXIS: 69 DEGREES
T AXIS: 36 DEGREES
T OFFSET: 424 MS
VENTRICULAR RATE: 74 BPM

## 2025-08-15 ENCOUNTER — APPOINTMENT (OUTPATIENT)
Dept: MATERNAL FETAL MEDICINE | Facility: CLINIC | Age: 30
End: 2025-08-15
Payer: COMMERCIAL

## 2025-08-22 ENCOUNTER — ROUTINE PRENATAL (OUTPATIENT)
Dept: MATERNAL FETAL MEDICINE | Facility: CLINIC | Age: 30
End: 2025-08-22
Payer: COMMERCIAL

## 2025-08-22 ENCOUNTER — TELEPHONE (OUTPATIENT)
Dept: RADIOLOGY | Facility: CLINIC | Age: 30
End: 2025-08-22

## 2025-08-22 VITALS
BODY MASS INDEX: 37.82 KG/M2 | HEART RATE: 85 BPM | WEIGHT: 227 LBS | DIASTOLIC BLOOD PRESSURE: 95 MMHG | HEIGHT: 65 IN | SYSTOLIC BLOOD PRESSURE: 138 MMHG

## 2025-08-22 DIAGNOSIS — O10.919 CHRONIC HYPERTENSION AFFECTING PREGNANCY (HHS-HCC): ICD-10-CM

## 2025-08-22 DIAGNOSIS — O99.210 OBESITY IN PREGNANCY (HHS-HCC): ICD-10-CM

## 2025-08-22 DIAGNOSIS — O22.32 DEEP VEIN THROMBOSIS (DVT) AFFECTING PREGNANCY IN SECOND TRIMESTER (HHS-HCC): ICD-10-CM

## 2025-08-22 DIAGNOSIS — O34.219 HISTORY OF CESAREAN DELIVERY AFFECTING PREGNANCY (HHS-HCC): ICD-10-CM

## 2025-08-22 DIAGNOSIS — O09.90 SUPERVISION OF HIGH RISK PREGNANCY, ANTEPARTUM (HHS-HCC): ICD-10-CM

## 2025-08-22 DIAGNOSIS — O23.42 URINARY TRACT INFECTION IN MOTHER DURING SECOND TRIMESTER OF PREGNANCY (HHS-HCC): Primary | ICD-10-CM

## 2025-08-22 DIAGNOSIS — Z3A.18 18 WEEKS GESTATION OF PREGNANCY (HHS-HCC): ICD-10-CM

## 2025-08-22 LAB
POC APPEARANCE, URINE: CLEAR
POC BILIRUBIN, URINE: NEGATIVE
POC BLOOD, URINE: NEGATIVE
POC COLOR, URINE: YELLOW
POC GLUCOSE, URINE: NEGATIVE MG/DL
POC KETONES, URINE: NEGATIVE MG/DL
POC LEUKOCYTES, URINE: NEGATIVE
POC NITRITE,URINE: NEGATIVE
POC PH, URINE: 6 PH
POC PROTEIN, URINE: NEGATIVE MG/DL
POC SPECIFIC GRAVITY, URINE: >=1.03
POC UROBILINOGEN, URINE: 2 EU/DL

## 2025-08-22 PROCEDURE — 99214 OFFICE O/P EST MOD 30 MIN: CPT | Performed by: STUDENT IN AN ORGANIZED HEALTH CARE EDUCATION/TRAINING PROGRAM

## 2025-08-22 PROCEDURE — 81003 URINALYSIS AUTO W/O SCOPE: CPT | Performed by: STUDENT IN AN ORGANIZED HEALTH CARE EDUCATION/TRAINING PROGRAM

## 2025-08-22 PROCEDURE — 99213 OFFICE O/P EST LOW 20 MIN: CPT

## 2025-08-22 RX ORDER — NIFEDIPINE 30 MG/1
30 TABLET, EXTENDED RELEASE ORAL 2 TIMES DAILY
Qty: 180 TABLET | Refills: 3 | Status: SHIPPED | OUTPATIENT
Start: 2025-08-22 | End: 2026-08-22

## 2025-08-22 ASSESSMENT — PAIN SCALES - GENERAL: PAINLEVEL_OUTOF10: 0 - NO PAIN

## 2025-08-22 ASSESSMENT — PAIN - FUNCTIONAL ASSESSMENT: PAIN_FUNCTIONAL_ASSESSMENT: 0-10

## 2025-08-24 LAB — BACTERIA UR CULT: NORMAL

## 2025-09-02 ENCOUNTER — HOSPITAL ENCOUNTER (OUTPATIENT)
Dept: RADIOLOGY | Facility: CLINIC | Age: 30
Discharge: HOME | End: 2025-09-02
Payer: COMMERCIAL

## 2025-09-02 DIAGNOSIS — O34.219 MATERNAL CARE FOR UNSPECIFIED TYPE SCAR FROM PREVIOUS CESAREAN DELIVERY (HHS-HCC): ICD-10-CM

## 2025-09-02 DIAGNOSIS — O35.9XX0 MATERNAL CARE FOR (SUSPECTED) FETAL ABNORMALITY AND DAMAGE, UNSPECIFIED, NOT APPLICABLE OR UNSPECIFIED: ICD-10-CM

## 2025-09-02 DIAGNOSIS — Z3A.10 10 WEEKS GESTATION OF PREGNANCY (HHS-HCC): ICD-10-CM

## 2025-09-02 DIAGNOSIS — O99.212 OBESITY COMPLICATING PREGNANCY, SECOND TRIMESTER (HHS-HCC): ICD-10-CM

## 2025-09-02 PROCEDURE — 76811 OB US DETAILED SNGL FETUS: CPT | Performed by: MEDICAL GENETICS

## 2025-09-02 PROCEDURE — 76811 OB US DETAILED SNGL FETUS: CPT

## 2025-10-02 ENCOUNTER — APPOINTMENT (OUTPATIENT)
Dept: BEHAVIORAL HEALTH | Facility: CLINIC | Age: 30
End: 2025-10-02
Payer: COMMERCIAL

## 2025-12-30 ENCOUNTER — APPOINTMENT (OUTPATIENT)
Dept: PRIMARY CARE | Facility: CLINIC | Age: 30
End: 2025-12-30
Payer: COMMERCIAL